# Patient Record
Sex: FEMALE | Race: WHITE | Employment: UNEMPLOYED | URBAN - METROPOLITAN AREA
[De-identification: names, ages, dates, MRNs, and addresses within clinical notes are randomized per-mention and may not be internally consistent; named-entity substitution may affect disease eponyms.]

---

## 2023-04-07 DIAGNOSIS — E11.9 TYPE 2 DIABETES MELLITUS WITHOUT COMPLICATION, UNSPECIFIED WHETHER LONG TERM INSULIN USE (MULTI): ICD-10-CM

## 2023-04-19 NOTE — TELEPHONE ENCOUNTER
Pt left a msg asking for refills of her Ozempic and Triamcinolone.  Pharm is [facility].

## 2023-05-02 ENCOUNTER — TELEPHONE (OUTPATIENT)
Dept: PRIMARY CARE | Facility: CLINIC | Age: 53
End: 2023-05-02
Payer: COMMERCIAL

## 2023-05-02 NOTE — TELEPHONE ENCOUNTER
Pt left a msg stating that she needs a new script sent for her Ozempic.  Pharm is [facility].

## 2023-05-30 LAB
ALANINE AMINOTRANSFERASE (SGPT) (U/L) IN SER/PLAS: 28 U/L (ref 7–45)
ALBUMIN (G/DL) IN SER/PLAS: 4.4 G/DL (ref 3.4–5)
ALKALINE PHOSPHATASE (U/L) IN SER/PLAS: 65 U/L (ref 33–110)
ANION GAP IN SER/PLAS: 11 MMOL/L (ref 10–20)
ASPARTATE AMINOTRANSFERASE (SGOT) (U/L) IN SER/PLAS: 26 U/L (ref 9–39)
BILIRUBIN TOTAL (MG/DL) IN SER/PLAS: 1.1 MG/DL (ref 0–1.2)
CALCIUM (MG/DL) IN SER/PLAS: 9.6 MG/DL (ref 8.6–10.3)
CARBON DIOXIDE, TOTAL (MMOL/L) IN SER/PLAS: 29 MMOL/L (ref 21–32)
CHLORIDE (MMOL/L) IN SER/PLAS: 104 MMOL/L (ref 98–107)
CHOLESTEROL (MG/DL) IN SER/PLAS: 160 MG/DL (ref 0–199)
CHOLESTEROL IN HDL (MG/DL) IN SER/PLAS: 88.4 MG/DL
CHOLESTEROL/HDL RATIO: 1.8
CREATININE (MG/DL) IN SER/PLAS: 0.77 MG/DL (ref 0.5–1.05)
ERYTHROCYTE DISTRIBUTION WIDTH (RATIO) BY AUTOMATED COUNT: 13.3 % (ref 11.5–14.5)
ERYTHROCYTE MEAN CORPUSCULAR HEMOGLOBIN CONCENTRATION (G/DL) BY AUTOMATED: 31.7 G/DL (ref 32–36)
ERYTHROCYTE MEAN CORPUSCULAR VOLUME (FL) BY AUTOMATED COUNT: 92 FL (ref 80–100)
ERYTHROCYTES (10*6/UL) IN BLOOD BY AUTOMATED COUNT: 4.3 X10E12/L (ref 4–5.2)
GFR FEMALE: >90 ML/MIN/1.73M2
GLUCOSE (MG/DL) IN SER/PLAS: 151 MG/DL (ref 74–99)
HEMATOCRIT (%) IN BLOOD BY AUTOMATED COUNT: 39.4 % (ref 36–46)
HEMOGLOBIN (G/DL) IN BLOOD: 12.5 G/DL (ref 12–16)
LDL: 56 MG/DL (ref 0–99)
LEUKOCYTES (10*3/UL) IN BLOOD BY AUTOMATED COUNT: 5 X10E9/L (ref 4.4–11.3)
PLATELETS (10*3/UL) IN BLOOD AUTOMATED COUNT: 339 X10E9/L (ref 150–450)
POTASSIUM (MMOL/L) IN SER/PLAS: 5.2 MMOL/L (ref 3.5–5.3)
PROTEIN TOTAL: 6.4 G/DL (ref 6.4–8.2)
SODIUM (MMOL/L) IN SER/PLAS: 139 MMOL/L (ref 136–145)
TRIGLYCERIDE (MG/DL) IN SER/PLAS: 79 MG/DL (ref 0–149)
UREA NITROGEN (MG/DL) IN SER/PLAS: 9 MG/DL (ref 6–23)
VLDL: 16 MG/DL (ref 0–40)

## 2023-05-31 LAB
ESTIMATED AVERAGE GLUCOSE FOR HBA1C: 140 MG/DL
HEMOGLOBIN A1C/HEMOGLOBIN TOTAL IN BLOOD: 6.5 %

## 2023-06-01 ENCOUNTER — OFFICE VISIT (OUTPATIENT)
Dept: PRIMARY CARE | Facility: CLINIC | Age: 53
End: 2023-06-01
Payer: COMMERCIAL

## 2023-06-01 VITALS
OXYGEN SATURATION: 99 % | BODY MASS INDEX: 22.42 KG/M2 | WEIGHT: 122.6 LBS | TEMPERATURE: 98 F | DIASTOLIC BLOOD PRESSURE: 60 MMHG | HEART RATE: 81 BPM | SYSTOLIC BLOOD PRESSURE: 112 MMHG

## 2023-06-01 DIAGNOSIS — E78.2 MIXED HYPERLIPIDEMIA: ICD-10-CM

## 2023-06-01 DIAGNOSIS — F41.8 DEPRESSION WITH ANXIETY: ICD-10-CM

## 2023-06-01 DIAGNOSIS — E11.9 TYPE 2 DIABETES MELLITUS WITHOUT COMPLICATION, WITHOUT LONG-TERM CURRENT USE OF INSULIN (MULTI): Primary | ICD-10-CM

## 2023-06-01 PROCEDURE — 1036F TOBACCO NON-USER: CPT | Performed by: STUDENT IN AN ORGANIZED HEALTH CARE EDUCATION/TRAINING PROGRAM

## 2023-06-01 PROCEDURE — 3044F HG A1C LEVEL LT 7.0%: CPT | Performed by: STUDENT IN AN ORGANIZED HEALTH CARE EDUCATION/TRAINING PROGRAM

## 2023-06-01 PROCEDURE — 3078F DIAST BP <80 MM HG: CPT | Performed by: STUDENT IN AN ORGANIZED HEALTH CARE EDUCATION/TRAINING PROGRAM

## 2023-06-01 PROCEDURE — 4010F ACE/ARB THERAPY RXD/TAKEN: CPT | Performed by: STUDENT IN AN ORGANIZED HEALTH CARE EDUCATION/TRAINING PROGRAM

## 2023-06-01 PROCEDURE — 99214 OFFICE O/P EST MOD 30 MIN: CPT | Performed by: STUDENT IN AN ORGANIZED HEALTH CARE EDUCATION/TRAINING PROGRAM

## 2023-06-01 PROCEDURE — 3074F SYST BP LT 130 MM HG: CPT | Performed by: STUDENT IN AN ORGANIZED HEALTH CARE EDUCATION/TRAINING PROGRAM

## 2023-06-01 RX ORDER — VENLAFAXINE HYDROCHLORIDE 75 MG/1
75 CAPSULE, EXTENDED RELEASE ORAL DAILY
Qty: 90 CAPSULE | Refills: 3 | Status: SHIPPED | OUTPATIENT
Start: 2023-06-01 | End: 2024-06-03 | Stop reason: SDUPTHER

## 2023-06-01 RX ORDER — BUPROPION HYDROCHLORIDE 300 MG/1
300 TABLET ORAL DAILY
Qty: 90 TABLET | Refills: 3 | Status: SHIPPED | OUTPATIENT
Start: 2023-06-01 | End: 2024-06-03 | Stop reason: SDUPTHER

## 2023-06-01 RX ORDER — ROSUVASTATIN CALCIUM 40 MG/1
40 TABLET, COATED ORAL DAILY
COMMUNITY
End: 2023-06-01 | Stop reason: SDUPTHER

## 2023-06-01 RX ORDER — METFORMIN HYDROCHLORIDE 1000 MG/1
1000 TABLET ORAL
Qty: 90 TABLET | Refills: 1 | Status: SHIPPED | OUTPATIENT
Start: 2023-06-01 | End: 2023-08-15 | Stop reason: SDUPTHER

## 2023-06-01 RX ORDER — VENLAFAXINE HYDROCHLORIDE 75 MG/1
75 CAPSULE, EXTENDED RELEASE ORAL DAILY
COMMUNITY
End: 2023-06-01 | Stop reason: SDUPTHER

## 2023-06-01 RX ORDER — METFORMIN HYDROCHLORIDE 1000 MG/1
1000 TABLET ORAL
COMMUNITY
Start: 2022-06-04 | End: 2023-06-01 | Stop reason: SDUPTHER

## 2023-06-01 RX ORDER — TRIAMCINOLONE ACETONIDE 1 MG/G
OINTMENT TOPICAL 2 TIMES DAILY
COMMUNITY

## 2023-06-01 RX ORDER — SEMAGLUTIDE 1.34 MG/ML
0.25 INJECTION, SOLUTION SUBCUTANEOUS
COMMUNITY
End: 2023-06-01 | Stop reason: SDUPTHER

## 2023-06-01 RX ORDER — ROSUVASTATIN CALCIUM 40 MG/1
40 TABLET, COATED ORAL DAILY
Qty: 90 TABLET | Refills: 3 | Status: SHIPPED | OUTPATIENT
Start: 2023-06-01 | End: 2024-06-03 | Stop reason: SDUPTHER

## 2023-06-01 RX ORDER — SEMAGLUTIDE 1.34 MG/ML
0.25 INJECTION, SOLUTION SUBCUTANEOUS
Qty: 1 EACH | Refills: 5 | Status: SHIPPED | OUTPATIENT
Start: 2023-06-01 | End: 2023-06-05 | Stop reason: WASHOUT

## 2023-06-01 RX ORDER — LISINOPRIL 2.5 MG/1
2.5 TABLET ORAL DAILY
Qty: 90 TABLET | Refills: 1 | Status: SHIPPED | OUTPATIENT
Start: 2023-06-01 | End: 2024-02-26

## 2023-06-01 RX ORDER — BUPROPION HYDROCHLORIDE 300 MG/1
300 TABLET ORAL DAILY
COMMUNITY
End: 2023-06-01 | Stop reason: SDUPTHER

## 2023-06-01 RX ORDER — LISINOPRIL 2.5 MG/1
2.5 TABLET ORAL DAILY
COMMUNITY
End: 2023-06-01 | Stop reason: SDUPTHER

## 2023-06-01 ASSESSMENT — ENCOUNTER SYMPTOMS
DYSURIA: 0
PALPITATIONS: 0
DIARRHEA: 0
CONSTIPATION: 0
HEADACHES: 0
FEVER: 0
FREQUENCY: 0
NAUSEA: 0
ABDOMINAL PAIN: 0
FATIGUE: 0
CHILLS: 0
NUMBNESS: 0
NERVOUS/ANXIOUS: 0
DIZZINESS: 0
COUGH: 0
WHEEZING: 0
HEMATURIA: 0
SHORTNESS OF BREATH: 0
DYSPHORIC MOOD: 0

## 2023-06-01 ASSESSMENT — PATIENT HEALTH QUESTIONNAIRE - PHQ9
5. POOR APPETITE OR OVEREATING: NOT AT ALL
6. FEELING BAD ABOUT YOURSELF - OR THAT YOU ARE A FAILURE OR HAVE LET YOURSELF OR YOUR FAMILY DOWN: NOT AT ALL
7. TROUBLE CONCENTRATING ON THINGS, SUCH AS READING THE NEWSPAPER OR WATCHING TELEVISION: NOT AT ALL
2. FEELING DOWN, DEPRESSED OR HOPELESS: NOT AT ALL
SUM OF ALL RESPONSES TO PHQ9 QUESTIONS 1 AND 2: 0
SUM OF ALL RESPONSES TO PHQ QUESTIONS 1-9: 1
1. LITTLE INTEREST OR PLEASURE IN DOING THINGS: NOT AT ALL
9. THOUGHTS THAT YOU WOULD BE BETTER OFF DEAD, OR OF HURTING YOURSELF: NOT AT ALL
1. LITTLE INTEREST OR PLEASURE IN DOING THINGS: NOT AT ALL
4. FEELING TIRED OR HAVING LITTLE ENERGY: SEVERAL DAYS
SUM OF ALL RESPONSES TO PHQ9 QUESTIONS 1 AND 2: 0
8. MOVING OR SPEAKING SO SLOWLY THAT OTHER PEOPLE COULD HAVE NOTICED. OR THE OPPOSITE, BEING SO FIGETY OR RESTLESS THAT YOU HAVE BEEN MOVING AROUND A LOT MORE THAN USUAL: NOT AT ALL
10. IF YOU CHECKED OFF ANY PROBLEMS, HOW DIFFICULT HAVE THESE PROBLEMS MADE IT FOR YOU TO DO YOUR WORK, TAKE CARE OF THINGS AT HOME, OR GET ALONG WITH OTHER PEOPLE: NOT DIFFICULT AT ALL
3. TROUBLE FALLING OR STAYING ASLEEP OR SLEEPING TOO MUCH: NOT AT ALL
2. FEELING DOWN, DEPRESSED OR HOPELESS: NOT AT ALL

## 2023-06-01 ASSESSMENT — ANXIETY QUESTIONNAIRES
IF YOU CHECKED OFF ANY PROBLEMS ON THIS QUESTIONNAIRE, HOW DIFFICULT HAVE THESE PROBLEMS MADE IT FOR YOU TO DO YOUR WORK, TAKE CARE OF THINGS AT HOME, OR GET ALONG WITH OTHER PEOPLE: NOT DIFFICULT AT ALL
5. BEING SO RESTLESS THAT IT IS HARD TO SIT STILL: NOT AT ALL
7. FEELING AFRAID AS IF SOMETHING AWFUL MIGHT HAPPEN: NOT AT ALL
GAD7 TOTAL SCORE: 2
3. WORRYING TOO MUCH ABOUT DIFFERENT THINGS: SEVERAL DAYS
1. FEELING NERVOUS, ANXIOUS, OR ON EDGE: SEVERAL DAYS
4. TROUBLE RELAXING: NOT AT ALL
6. BECOMING EASILY ANNOYED OR IRRITABLE: NOT AT ALL
2. NOT BEING ABLE TO STOP OR CONTROL WORRYING: NOT AT ALL

## 2023-06-01 NOTE — ASSESSMENT & PLAN NOTE
Stable.  We will continue Ozempic and metformin.  Ozempic makes a huge difference with her blood sugars and I feel that it is necessary that she be on this medication to control her diabetes.

## 2023-06-01 NOTE — PROGRESS NOTES
Subjective   Patient ID: Farida Astudillo is a 53 y.o. female who presents for Depression and Anxiety (Folow up).    HPI   Patient here for med refills. She is on ozempic and has been out of it for a few weeks. It has been higher since off of ozempic. On 0.25 mg of ozempic. It helps with appetite suppressant.     She has also been more fatigued.    Blood work looked good. She is doing well from anxiety and depression standpoint.     She is also had red, itchy rash on her forehead along her hairline.  No recent changes in shampoos or soaps.  Tries to avoid getting sun on her head.  She does have a history of eczema.  She has tried moisturizing    Review of Systems   Constitutional:  Negative for chills, fatigue and fever.   Respiratory:  Negative for cough, shortness of breath and wheezing.    Cardiovascular:  Negative for chest pain, palpitations and leg swelling.   Gastrointestinal:  Negative for abdominal pain, constipation, diarrhea and nausea.   Genitourinary:  Negative for dysuria, frequency, hematuria and urgency.   Neurological:  Negative for dizziness, numbness and headaches.   Psychiatric/Behavioral:  Negative for dysphoric mood. The patient is not nervous/anxious.        Objective   /60 (BP Location: Left arm, Patient Position: Sitting, BP Cuff Size: Adult)   Pulse 81   Temp 36.7 °C (98 °F) (Temporal)   Wt 55.6 kg (122 lb 9.6 oz)   SpO2 99%   BMI 22.42 kg/m²     Physical Exam  Constitutional:       Appearance: Normal appearance.   HENT:      Head: Normocephalic and atraumatic.   Eyes:      Extraocular Movements: Extraocular movements intact.      Pupils: Pupils are equal, round, and reactive to light.   Cardiovascular:      Rate and Rhythm: Normal rate and regular rhythm.      Heart sounds: Normal heart sounds. No murmur heard.  Pulmonary:      Effort: Pulmonary effort is normal.      Breath sounds: Normal breath sounds. No wheezing.   Abdominal:      General: Bowel sounds are normal.       Palpations: Abdomen is soft.      Tenderness: There is no abdominal tenderness. There is no guarding.   Musculoskeletal:         General: Normal range of motion.   Skin:     General: Skin is warm and dry.      Findings: Erythema present.          Neurological:      General: No focal deficit present.      Mental Status: She is alert and oriented to person, place, and time.   Psychiatric:         Mood and Affect: Mood normal.         Behavior: Behavior normal.         Assessment/Plan   Problem List Items Addressed This Visit       Diabetes mellitus, type 2 (CMS/HCC) - Primary     Stable.  We will continue Ozempic and metformin.  Ozempic makes a huge difference with her blood sugars and I feel that it is necessary that she be on this medication to control her diabetes.         Relevant Medications    lisinopril 2.5 mg tablet    metFORMIN (Glucophage) 1,000 mg tablet    semaglutide (Ozempic) 0.25 mg or 0.5 mg(2 mg/1.5 mL) pen injector    Depression with anxiety     Doing well with Wellbutrin and Effexor.  Medications refilled         Relevant Medications    buPROPion XL (Wellbutrin XL) 300 mg 24 hr tablet    venlafaxine XR (Effexor-XR) 75 mg 24 hr capsule    Mixed hyperlipidemia     Cholesterol looked great on blood work done few days ago.  Rosuvastatin refilled         Relevant Medications    rosuvastatin (Crestor) 40 mg tablet            Patient understands and agrees with treatment plan    Kaykay Williamson DO   06/01/23

## 2023-06-05 DIAGNOSIS — E11.9 TYPE 2 DIABETES MELLITUS WITHOUT COMPLICATION, WITHOUT LONG-TERM CURRENT USE OF INSULIN (MULTI): ICD-10-CM

## 2023-08-14 ENCOUNTER — PATIENT MESSAGE (OUTPATIENT)
Dept: PRIMARY CARE | Facility: CLINIC | Age: 53
End: 2023-08-14
Payer: COMMERCIAL

## 2023-08-14 DIAGNOSIS — E11.9 TYPE 2 DIABETES MELLITUS WITHOUT COMPLICATION, WITHOUT LONG-TERM CURRENT USE OF INSULIN (MULTI): ICD-10-CM

## 2023-08-15 RX ORDER — METFORMIN HYDROCHLORIDE 1000 MG/1
1000 TABLET ORAL
Qty: 90 TABLET | Refills: 1 | Status: SHIPPED | OUTPATIENT
Start: 2023-08-15 | End: 2023-10-17

## 2023-10-16 DIAGNOSIS — E11.9 TYPE 2 DIABETES MELLITUS WITHOUT COMPLICATION, WITHOUT LONG-TERM CURRENT USE OF INSULIN (MULTI): ICD-10-CM

## 2023-10-17 RX ORDER — METFORMIN HYDROCHLORIDE 500 MG/1
500 TABLET ORAL
Qty: 180 TABLET | Refills: 1 | OUTPATIENT
Start: 2023-10-17

## 2023-10-17 RX ORDER — METFORMIN HYDROCHLORIDE 1000 MG/1
1000 TABLET ORAL
Qty: 180 TABLET | Refills: 0 | Status: SHIPPED | OUTPATIENT
Start: 2023-10-17 | End: 2024-02-26

## 2023-12-01 ENCOUNTER — OFFICE VISIT (OUTPATIENT)
Dept: PRIMARY CARE | Facility: CLINIC | Age: 53
End: 2023-12-01
Payer: COMMERCIAL

## 2023-12-01 VITALS
SYSTOLIC BLOOD PRESSURE: 110 MMHG | BODY MASS INDEX: 21.18 KG/M2 | WEIGHT: 115.8 LBS | OXYGEN SATURATION: 97 % | TEMPERATURE: 97.7 F | HEART RATE: 73 BPM | DIASTOLIC BLOOD PRESSURE: 64 MMHG

## 2023-12-01 DIAGNOSIS — F41.8 DEPRESSION WITH ANXIETY: ICD-10-CM

## 2023-12-01 DIAGNOSIS — Z00.00 HEALTH MAINTENANCE EXAMINATION: ICD-10-CM

## 2023-12-01 DIAGNOSIS — E11.9 TYPE 2 DIABETES MELLITUS WITHOUT COMPLICATION, WITHOUT LONG-TERM CURRENT USE OF INSULIN (MULTI): Primary | ICD-10-CM

## 2023-12-01 LAB — POC HEMOGLOBIN A1C: 6 % (ref 4.2–6.5)

## 2023-12-01 PROCEDURE — 3044F HG A1C LEVEL LT 7.0%: CPT | Performed by: STUDENT IN AN ORGANIZED HEALTH CARE EDUCATION/TRAINING PROGRAM

## 2023-12-01 PROCEDURE — 83036 HEMOGLOBIN GLYCOSYLATED A1C: CPT | Performed by: STUDENT IN AN ORGANIZED HEALTH CARE EDUCATION/TRAINING PROGRAM

## 2023-12-01 PROCEDURE — 1036F TOBACCO NON-USER: CPT | Performed by: STUDENT IN AN ORGANIZED HEALTH CARE EDUCATION/TRAINING PROGRAM

## 2023-12-01 PROCEDURE — 3074F SYST BP LT 130 MM HG: CPT | Performed by: STUDENT IN AN ORGANIZED HEALTH CARE EDUCATION/TRAINING PROGRAM

## 2023-12-01 PROCEDURE — 4010F ACE/ARB THERAPY RXD/TAKEN: CPT | Performed by: STUDENT IN AN ORGANIZED HEALTH CARE EDUCATION/TRAINING PROGRAM

## 2023-12-01 PROCEDURE — 3078F DIAST BP <80 MM HG: CPT | Performed by: STUDENT IN AN ORGANIZED HEALTH CARE EDUCATION/TRAINING PROGRAM

## 2023-12-01 PROCEDURE — 99213 OFFICE O/P EST LOW 20 MIN: CPT | Performed by: STUDENT IN AN ORGANIZED HEALTH CARE EDUCATION/TRAINING PROGRAM

## 2023-12-01 ASSESSMENT — PATIENT HEALTH QUESTIONNAIRE - PHQ9
6. FEELING BAD ABOUT YOURSELF - OR THAT YOU ARE A FAILURE OR HAVE LET YOURSELF OR YOUR FAMILY DOWN: NOT AT ALL
1. LITTLE INTEREST OR PLEASURE IN DOING THINGS: NOT AT ALL
2. FEELING DOWN, DEPRESSED OR HOPELESS: NOT AT ALL
SUM OF ALL RESPONSES TO PHQ9 QUESTIONS 1 AND 2: 0
5. POOR APPETITE OR OVEREATING: SEVERAL DAYS
8. MOVING OR SPEAKING SO SLOWLY THAT OTHER PEOPLE COULD HAVE NOTICED. OR THE OPPOSITE, BEING SO FIGETY OR RESTLESS THAT YOU HAVE BEEN MOVING AROUND A LOT MORE THAN USUAL: NOT AT ALL
1. LITTLE INTEREST OR PLEASURE IN DOING THINGS: NOT AT ALL
10. IF YOU CHECKED OFF ANY PROBLEMS, HOW DIFFICULT HAVE THESE PROBLEMS MADE IT FOR YOU TO DO YOUR WORK, TAKE CARE OF THINGS AT HOME, OR GET ALONG WITH OTHER PEOPLE: SOMEWHAT DIFFICULT
7. TROUBLE CONCENTRATING ON THINGS, SUCH AS READING THE NEWSPAPER OR WATCHING TELEVISION: NOT AT ALL
2. FEELING DOWN, DEPRESSED OR HOPELESS: NOT AT ALL
3. TROUBLE FALLING OR STAYING ASLEEP OR SLEEPING TOO MUCH: SEVERAL DAYS
SUM OF ALL RESPONSES TO PHQ QUESTIONS 1-9: 3
SUM OF ALL RESPONSES TO PHQ9 QUESTIONS 1 AND 2: 0
9. THOUGHTS THAT YOU WOULD BE BETTER OFF DEAD, OR OF HURTING YOURSELF: NOT AT ALL
4. FEELING TIRED OR HAVING LITTLE ENERGY: SEVERAL DAYS

## 2023-12-01 ASSESSMENT — ENCOUNTER SYMPTOMS
ABDOMINAL PAIN: 0
CONSTIPATION: 0
PALPITATIONS: 0
DIZZINESS: 0
DIARRHEA: 0
DYSPHORIC MOOD: 0
FREQUENCY: 0
FATIGUE: 0
NUMBNESS: 0
NAUSEA: 0
DYSURIA: 0
CHILLS: 0
FEVER: 0
NERVOUS/ANXIOUS: 0
SHORTNESS OF BREATH: 0
HEADACHES: 0
WHEEZING: 0
COUGH: 0
HEMATURIA: 0

## 2023-12-01 ASSESSMENT — ANXIETY QUESTIONNAIRES
IF YOU CHECKED OFF ANY PROBLEMS ON THIS QUESTIONNAIRE, HOW DIFFICULT HAVE THESE PROBLEMS MADE IT FOR YOU TO DO YOUR WORK, TAKE CARE OF THINGS AT HOME, OR GET ALONG WITH OTHER PEOPLE: SOMEWHAT DIFFICULT
2. NOT BEING ABLE TO STOP OR CONTROL WORRYING: NOT AT ALL
3. WORRYING TOO MUCH ABOUT DIFFERENT THINGS: NOT AT ALL
4. TROUBLE RELAXING: SEVERAL DAYS
5. BEING SO RESTLESS THAT IT IS HARD TO SIT STILL: NOT AT ALL
6. BECOMING EASILY ANNOYED OR IRRITABLE: NOT AT ALL
1. FEELING NERVOUS, ANXIOUS, OR ON EDGE: NOT AT ALL
GAD7 TOTAL SCORE: 1
7. FEELING AFRAID AS IF SOMETHING AWFUL MIGHT HAPPEN: NOT AT ALL

## 2023-12-01 NOTE — PATIENT INSTRUCTIONS
Medications refilled today  Recommend trying to eat enough so that there is no further weight loss  A1c checked in office today  Will see you back in 6 months for a physical with fasting blood work prior

## 2023-12-01 NOTE — PROGRESS NOTES
Subjective   Patient ID: Farida Astudillo is a 53 y.o. female who presents for Anxiety and Depression (Follow up).    HPI   Anxiety and depression F/up  Current medication: Wellbutrin 300 mg, Effexor 75 mg  How they feel: feels good  Side effects: none  Previous medications: none  Self care: exercise    Farida Astudillo is an 53 y.o.  y.o. female who presents for follow up of diabetes. Current symptoms include: none. Patient denies increased appetite, paresthesia of the feet, and visual disturbances. Evaluation to date has included: fasting blood sugar, fasting lipid panel, hemoglobin A1C, and microalbuminuria. Home sugars: not checking . Current treatments: ozempic, metformin . Last dilated eye exam 2 years ago. She has had weight loss. Hasn't been eating much for the last week or so. She is now feeling her appetite has come back. She doesn't want to lose more weight. She was previously 10.1 on A1c but now is much better controlled.         Review of Systems   Constitutional:  Negative for chills, fatigue and fever.   Respiratory:  Negative for cough, shortness of breath and wheezing.    Cardiovascular:  Negative for chest pain, palpitations and leg swelling.   Gastrointestinal:  Negative for abdominal pain, constipation, diarrhea and nausea.   Genitourinary:  Negative for dysuria, frequency, hematuria and urgency.   Neurological:  Negative for dizziness, numbness and headaches.   Psychiatric/Behavioral:  Negative for dysphoric mood. The patient is not nervous/anxious.        Objective   /64 (BP Location: Left arm, Patient Position: Sitting, BP Cuff Size: Adult)   Pulse 73   Temp 36.5 °C (97.7 °F) (Temporal)   Wt 52.5 kg (115 lb 12.8 oz)   SpO2 97%   BMI 21.18 kg/m²     Physical Exam  Constitutional:       Appearance: Normal appearance.   HENT:      Head: Normocephalic and atraumatic.   Eyes:      Extraocular Movements: Extraocular movements intact.      Pupils: Pupils are equal, round, and reactive to  light.   Cardiovascular:      Rate and Rhythm: Normal rate and regular rhythm.      Heart sounds: Normal heart sounds. No murmur heard.  Pulmonary:      Effort: Pulmonary effort is normal.      Breath sounds: Normal breath sounds. No wheezing.   Abdominal:      General: Bowel sounds are normal.      Palpations: Abdomen is soft.      Tenderness: There is no abdominal tenderness. There is no guarding.   Musculoskeletal:         General: Normal range of motion.   Skin:     General: Skin is warm and dry.   Neurological:      General: No focal deficit present.      Mental Status: She is alert and oriented to person, place, and time.   Psychiatric:         Mood and Affect: Mood normal.         Behavior: Behavior normal.       Assessment/Plan   Problem List Items Addressed This Visit       Diabetes mellitus, type 2 (CMS/HCC) - Primary    Depression with anxiety     Other Visit Diagnoses       Health maintenance examination        Relevant Orders    Lipid Panel    Comprehensive Metabolic Panel    CBC    Hemoglobin A1C          Doing well from an anxiety and depression standpoint.  She does not need refills on her medications so we will see her back in 6 months at her physical for that  Diabetes well-controlled.  She did have weight loss so we need to be cautious about that.  She states that she has had no appetite for the last several days which is not like her.  She said she is going to make sure that her weight is not going down and that it goes back up.         Patient understands and agrees with treatment plan    Kaykay Williamson, DO   12/01/23

## 2024-02-24 DIAGNOSIS — E11.9 TYPE 2 DIABETES MELLITUS WITHOUT COMPLICATION, WITHOUT LONG-TERM CURRENT USE OF INSULIN (MULTI): ICD-10-CM

## 2024-02-26 DIAGNOSIS — E11.9 TYPE 2 DIABETES MELLITUS WITHOUT COMPLICATION, WITHOUT LONG-TERM CURRENT USE OF INSULIN (MULTI): ICD-10-CM

## 2024-02-26 RX ORDER — SEMAGLUTIDE 0.68 MG/ML
INJECTION, SOLUTION SUBCUTANEOUS
Qty: 6 ML | Refills: 2 | Status: SHIPPED | OUTPATIENT
Start: 2024-02-26 | End: 2024-02-26 | Stop reason: SDUPTHER

## 2024-02-26 RX ORDER — METFORMIN HYDROCHLORIDE 1000 MG/1
1000 TABLET ORAL
Qty: 180 TABLET | Refills: 1 | Status: SHIPPED | OUTPATIENT
Start: 2024-02-26 | End: 2024-06-03 | Stop reason: SDUPTHER

## 2024-02-26 RX ORDER — LISINOPRIL 2.5 MG/1
2.5 TABLET ORAL DAILY
Qty: 90 TABLET | Refills: 1 | Status: SHIPPED | OUTPATIENT
Start: 2024-02-26 | End: 2024-06-03 | Stop reason: SDUPTHER

## 2024-02-26 RX ORDER — SEMAGLUTIDE 0.68 MG/ML
0.5 INJECTION, SOLUTION SUBCUTANEOUS
Qty: 6 ML | Refills: 2 | Status: SHIPPED | OUTPATIENT
Start: 2024-02-26

## 2024-06-01 DIAGNOSIS — F41.8 DEPRESSION WITH ANXIETY: ICD-10-CM

## 2024-06-01 ASSESSMENT — PROMIS GLOBAL HEALTH SCALE
CARRYOUT_PHYSICAL_ACTIVITIES: MOSTLY
RATE_AVERAGE_PAIN: 0
RATE_GENERAL_HEALTH: VERY GOOD
RATE_PHYSICAL_HEALTH: VERY GOOD
RATE_QUALITY_OF_LIFE: EXCELLENT
RATE_MENTAL_HEALTH: VERY GOOD
EMOTIONAL_PROBLEMS: RARELY
RATE_AVERAGE_FATIGUE: MILD
RATE_SOCIAL_SATISFACTION: VERY GOOD
CARRYOUT_SOCIAL_ACTIVITIES: VERY GOOD

## 2024-06-03 ENCOUNTER — OFFICE VISIT (OUTPATIENT)
Dept: PRIMARY CARE | Facility: CLINIC | Age: 54
End: 2024-06-03
Payer: COMMERCIAL

## 2024-06-03 ENCOUNTER — LAB (OUTPATIENT)
Dept: LAB | Facility: LAB | Age: 54
End: 2024-06-03
Payer: COMMERCIAL

## 2024-06-03 VITALS
OXYGEN SATURATION: 98 % | DIASTOLIC BLOOD PRESSURE: 78 MMHG | BODY MASS INDEX: 20.62 KG/M2 | SYSTOLIC BLOOD PRESSURE: 112 MMHG | HEIGHT: 63 IN | TEMPERATURE: 98 F | HEART RATE: 77 BPM | WEIGHT: 116.4 LBS

## 2024-06-03 DIAGNOSIS — Z12.31 BREAST CANCER SCREENING BY MAMMOGRAM: ICD-10-CM

## 2024-06-03 DIAGNOSIS — Z00.00 HEALTH MAINTENANCE EXAMINATION: Primary | ICD-10-CM

## 2024-06-03 DIAGNOSIS — E11.9 TYPE 2 DIABETES MELLITUS WITHOUT COMPLICATION, WITHOUT LONG-TERM CURRENT USE OF INSULIN (MULTI): ICD-10-CM

## 2024-06-03 DIAGNOSIS — Z12.11 COLON CANCER SCREENING: ICD-10-CM

## 2024-06-03 DIAGNOSIS — Z00.00 HEALTH MAINTENANCE EXAMINATION: ICD-10-CM

## 2024-06-03 DIAGNOSIS — E78.2 MIXED HYPERLIPIDEMIA: ICD-10-CM

## 2024-06-03 DIAGNOSIS — F41.8 DEPRESSION WITH ANXIETY: ICD-10-CM

## 2024-06-03 LAB
ALBUMIN SERPL BCP-MCNC: 4.9 G/DL (ref 3.4–5)
ALP SERPL-CCNC: 64 U/L (ref 33–110)
ALT SERPL W P-5'-P-CCNC: 38 U/L (ref 7–45)
ANION GAP SERPL CALC-SCNC: 12 MMOL/L (ref 10–20)
AST SERPL W P-5'-P-CCNC: 32 U/L (ref 9–39)
BILIRUB SERPL-MCNC: 1.3 MG/DL (ref 0–1.2)
BUN SERPL-MCNC: 11 MG/DL (ref 6–23)
CALCIUM SERPL-MCNC: 10.2 MG/DL (ref 8.6–10.3)
CHLORIDE SERPL-SCNC: 100 MMOL/L (ref 98–107)
CHOLEST SERPL-MCNC: 162 MG/DL (ref 0–199)
CHOLESTEROL/HDL RATIO: 1.9
CO2 SERPL-SCNC: 30 MMOL/L (ref 21–32)
CREAT SERPL-MCNC: 0.81 MG/DL (ref 0.5–1.05)
EGFRCR SERPLBLD CKD-EPI 2021: 86 ML/MIN/1.73M*2
ERYTHROCYTE [DISTWIDTH] IN BLOOD BY AUTOMATED COUNT: 12.7 % (ref 11.5–14.5)
GLUCOSE SERPL-MCNC: 126 MG/DL (ref 74–99)
HCT VFR BLD AUTO: 43.5 % (ref 36–46)
HDLC SERPL-MCNC: 86.3 MG/DL
HGB BLD-MCNC: 14.4 G/DL (ref 12–16)
LDLC SERPL CALC-MCNC: 60 MG/DL
MCH RBC QN AUTO: 30.1 PG (ref 26–34)
MCHC RBC AUTO-ENTMCNC: 33.1 G/DL (ref 32–36)
MCV RBC AUTO: 91 FL (ref 80–100)
NON HDL CHOLESTEROL: 76 MG/DL (ref 0–149)
NRBC BLD-RTO: 0 /100 WBCS (ref 0–0)
PLATELET # BLD AUTO: 341 X10*3/UL (ref 150–450)
POTASSIUM SERPL-SCNC: 5 MMOL/L (ref 3.5–5.3)
PROT SERPL-MCNC: 7.3 G/DL (ref 6.4–8.2)
RBC # BLD AUTO: 4.79 X10*6/UL (ref 4–5.2)
SODIUM SERPL-SCNC: 137 MMOL/L (ref 136–145)
TRIGL SERPL-MCNC: 80 MG/DL (ref 0–149)
VLDL: 16 MG/DL (ref 0–40)
WBC # BLD AUTO: 5 X10*3/UL (ref 4.4–11.3)

## 2024-06-03 PROCEDURE — 36415 COLL VENOUS BLD VENIPUNCTURE: CPT

## 2024-06-03 PROCEDURE — 3078F DIAST BP <80 MM HG: CPT | Performed by: STUDENT IN AN ORGANIZED HEALTH CARE EDUCATION/TRAINING PROGRAM

## 2024-06-03 PROCEDURE — 80061 LIPID PANEL: CPT

## 2024-06-03 PROCEDURE — 83036 HEMOGLOBIN GLYCOSYLATED A1C: CPT

## 2024-06-03 PROCEDURE — 4010F ACE/ARB THERAPY RXD/TAKEN: CPT | Performed by: STUDENT IN AN ORGANIZED HEALTH CARE EDUCATION/TRAINING PROGRAM

## 2024-06-03 PROCEDURE — 3074F SYST BP LT 130 MM HG: CPT | Performed by: STUDENT IN AN ORGANIZED HEALTH CARE EDUCATION/TRAINING PROGRAM

## 2024-06-03 PROCEDURE — 1036F TOBACCO NON-USER: CPT | Performed by: STUDENT IN AN ORGANIZED HEALTH CARE EDUCATION/TRAINING PROGRAM

## 2024-06-03 PROCEDURE — 85027 COMPLETE CBC AUTOMATED: CPT

## 2024-06-03 PROCEDURE — G0439 PPPS, SUBSEQ VISIT: HCPCS | Performed by: STUDENT IN AN ORGANIZED HEALTH CARE EDUCATION/TRAINING PROGRAM

## 2024-06-03 PROCEDURE — 80053 COMPREHEN METABOLIC PANEL: CPT

## 2024-06-03 PROCEDURE — 99214 OFFICE O/P EST MOD 30 MIN: CPT | Performed by: STUDENT IN AN ORGANIZED HEALTH CARE EDUCATION/TRAINING PROGRAM

## 2024-06-03 RX ORDER — METFORMIN HYDROCHLORIDE 1000 MG/1
1000 TABLET ORAL
Qty: 180 TABLET | Refills: 1 | Status: SHIPPED | OUTPATIENT
Start: 2024-06-03

## 2024-06-03 RX ORDER — BUPROPION HYDROCHLORIDE 300 MG/1
300 TABLET ORAL DAILY
Qty: 90 TABLET | Refills: 3 | OUTPATIENT
Start: 2024-06-03

## 2024-06-03 RX ORDER — VENLAFAXINE HYDROCHLORIDE 75 MG/1
75 CAPSULE, EXTENDED RELEASE ORAL DAILY
Qty: 90 CAPSULE | Refills: 3 | OUTPATIENT
Start: 2024-06-03

## 2024-06-03 RX ORDER — ROSUVASTATIN CALCIUM 40 MG/1
40 TABLET, COATED ORAL DAILY
Qty: 90 TABLET | Refills: 3 | Status: SHIPPED | OUTPATIENT
Start: 2024-06-03

## 2024-06-03 RX ORDER — VENLAFAXINE HYDROCHLORIDE 75 MG/1
75 CAPSULE, EXTENDED RELEASE ORAL DAILY
Qty: 90 CAPSULE | Refills: 3 | Status: SHIPPED | OUTPATIENT
Start: 2024-06-03

## 2024-06-03 RX ORDER — LISINOPRIL 2.5 MG/1
2.5 TABLET ORAL DAILY
Qty: 90 TABLET | Refills: 1 | Status: SHIPPED | OUTPATIENT
Start: 2024-06-03

## 2024-06-03 RX ORDER — BUPROPION HYDROCHLORIDE 300 MG/1
300 TABLET ORAL DAILY
Qty: 90 TABLET | Refills: 3 | Status: SHIPPED | OUTPATIENT
Start: 2024-06-03

## 2024-06-03 ASSESSMENT — ENCOUNTER SYMPTOMS
DIZZINESS: 0
HEMATURIA: 0
COUGH: 0
WHEEZING: 0
FEVER: 0
FATIGUE: 0
DYSPHORIC MOOD: 0
CHILLS: 0
CONSTIPATION: 0
ABDOMINAL PAIN: 0
NUMBNESS: 0
PALPITATIONS: 0
NERVOUS/ANXIOUS: 0
DEPRESSION: 0
HEADACHES: 0
LOSS OF SENSATION IN FEET: 0
FREQUENCY: 0
NAUSEA: 0
SHORTNESS OF BREATH: 0
DYSURIA: 0
DIARRHEA: 0
OCCASIONAL FEELINGS OF UNSTEADINESS: 0

## 2024-06-03 ASSESSMENT — PATIENT HEALTH QUESTIONNAIRE - PHQ9
1. LITTLE INTEREST OR PLEASURE IN DOING THINGS: NOT AT ALL
2. FEELING DOWN, DEPRESSED OR HOPELESS: NOT AT ALL
SUM OF ALL RESPONSES TO PHQ9 QUESTIONS 1 AND 2: 0

## 2024-06-03 NOTE — PROGRESS NOTES
"Farida Astudillo  presents for her annual wellness exam.    HPI  Specialists seen by patient: eye doctor  -dentist  -derm    Last pap/cervical cancer screening: has been awhile, needs to see one  Last mammogram:  needs ordered  Hx of colon ca screening:  hasn't done it. Will order one   Hx of DXA: n/a  LMP/menstrual cycles: not having  Contraception: n/a  Menopause: 9 years ago  Immunizations: not up to date - declines shingrix    Diet: Follows a healthy diet  Exercise: Gets regular exercise, weights and cardio, elliptical and running   Alcohol abuse screen:   On the weekends    How many times in the past year 4 or more drinks in a day? 48 times  Lung cancer screening:   Smoking history: never a smoker  Drug use: No    Follow up of diabetes.  Current treatments: Metformin 1000 mg twice daily, Ozempic 0.5 mg weekly.  Home sugars: ran out of test strips, it was running good . Statin: Yes rosuvastatin 40 mg. ACE/ARB: Yes lisinopril 2.5    Anxiety depression well-controlled on Wellbutrin and Effexor.      Review of Systems   Constitutional:  Negative for chills, fatigue and fever.   Respiratory:  Negative for cough, shortness of breath and wheezing.    Cardiovascular:  Negative for chest pain, palpitations and leg swelling.   Gastrointestinal:  Negative for abdominal pain, constipation, diarrhea and nausea.   Genitourinary:  Negative for dysuria, frequency, hematuria and urgency.   Neurological:  Negative for dizziness, numbness and headaches.   Psychiatric/Behavioral:  Negative for dysphoric mood. The patient is not nervous/anxious.           Objective    /78 (BP Location: Left arm, Patient Position: Sitting, BP Cuff Size: Adult)   Pulse 77   Temp 36.7 °C (98 °F) (Temporal)   Ht 1.588 m (5' 2.5\")   Wt 52.8 kg (116 lb 6.4 oz)   SpO2 98%   BMI 20.95 kg/m²     Physical Exam  Constitutional:       General: She is not in acute distress.     Appearance: Normal appearance.   HENT:      Head: Normocephalic and " atraumatic.      Right Ear: Tympanic membrane and ear canal normal.      Left Ear: Tympanic membrane and ear canal normal.      Mouth/Throat:      Mouth: Mucous membranes are moist.      Pharynx: No posterior oropharyngeal erythema.   Eyes:      Extraocular Movements: Extraocular movements intact.      Pupils: Pupils are equal, round, and reactive to light.   Cardiovascular:      Rate and Rhythm: Normal rate and regular rhythm.      Heart sounds: No murmur heard.  Pulmonary:      Effort: Pulmonary effort is normal. No respiratory distress.      Breath sounds: Normal breath sounds. No wheezing.   Abdominal:      General: Bowel sounds are normal.      Palpations: Abdomen is soft.      Tenderness: There is no abdominal tenderness. There is no guarding.   Musculoskeletal:         General: Normal range of motion.      Cervical back: Neck supple.   Skin:     General: Skin is warm and dry.   Neurological:      General: No focal deficit present.      Mental Status: She is alert and oriented to person, place, and time.   Psychiatric:         Mood and Affect: Mood normal.         Behavior: Behavior normal.            Problem List Items Addressed This Visit       Diabetes mellitus, type 2 (Multi)    Relevant Medications    lisinopril 2.5 mg tablet    metFORMIN (Glucophage) 1,000 mg tablet    Other Relevant Orders    Referral to Clinical Pharmacy    Depression with anxiety    Relevant Medications    buPROPion XL (Wellbutrin XL) 300 mg 24 hr tablet    venlafaxine XR (Effexor-XR) 75 mg 24 hr capsule    Mixed hyperlipidemia    Relevant Medications    rosuvastatin (Crestor) 40 mg tablet     Other Visit Diagnoses       Health maintenance examination    -  Primary    Breast cancer screening by mammogram        Relevant Orders    BI mammo bilateral screening tomosynthesis             We will obtain fasting blood work.  Results will be communicated to the patient via MyChart or a letter.   We reviewed appropriate preventative health  screening guidelines.  We discussed regular aerobic exercise. We discussed proper nutrition and weight control.  Blood sugar significantly improved since starting Ozempic.  She is on 0.5 mg and I would like for her to continue in addition to her metformin.  I have placed a referral to clinical pharmacy to see if they can help with coverage.    Doing well in terms of her anxiety and depression.  Effexor and Wellbutrin refilled      Kaykay Williamson,   06/03/24

## 2024-06-04 DIAGNOSIS — Z12.11 COLON CANCER SCREENING: ICD-10-CM

## 2024-06-04 LAB
EST. AVERAGE GLUCOSE BLD GHB EST-MCNC: 128 MG/DL
HBA1C MFR BLD: 6.1 %

## 2024-06-04 RX ORDER — POLYETHYLENE GLYCOL 3350, SODIUM CHLORIDE, SODIUM BICARBONATE, POTASSIUM CHLORIDE 420; 11.2; 5.72; 1.48 G/4L; G/4L; G/4L; G/4L
POWDER, FOR SOLUTION ORAL
Qty: 4000 ML | Refills: 0 | Status: SHIPPED | OUTPATIENT
Start: 2024-06-04

## 2024-06-05 DIAGNOSIS — E80.6 HYPERBILIRUBINEMIA: Primary | ICD-10-CM

## 2024-06-12 ENCOUNTER — HOSPITAL ENCOUNTER (OUTPATIENT)
Dept: RADIOLOGY | Facility: CLINIC | Age: 54
Discharge: HOME | End: 2024-06-12
Payer: COMMERCIAL

## 2024-06-12 VITALS — WEIGHT: 116 LBS | HEIGHT: 63 IN | BODY MASS INDEX: 20.55 KG/M2

## 2024-06-12 DIAGNOSIS — Z12.31 BREAST CANCER SCREENING BY MAMMOGRAM: ICD-10-CM

## 2024-06-12 PROCEDURE — 77067 SCR MAMMO BI INCL CAD: CPT | Mod: LT

## 2024-06-21 ENCOUNTER — HOSPITAL ENCOUNTER (OUTPATIENT)
Dept: RADIOLOGY | Facility: EXTERNAL LOCATION | Age: 54
Discharge: HOME | End: 2024-06-21

## 2024-06-26 DIAGNOSIS — F41.8 SITUATIONAL ANXIETY: Primary | ICD-10-CM

## 2024-06-26 RX ORDER — PROPRANOLOL HYDROCHLORIDE 10 MG/1
TABLET ORAL
Qty: 10 TABLET | Refills: 0 | Status: SHIPPED | OUTPATIENT
Start: 2024-06-26

## 2024-07-02 ENCOUNTER — APPOINTMENT (OUTPATIENT)
Dept: PHARMACY | Facility: HOSPITAL | Age: 54
End: 2024-07-02
Payer: COMMERCIAL

## 2024-07-02 DIAGNOSIS — E11.9 TYPE 2 DIABETES MELLITUS WITHOUT COMPLICATION, WITHOUT LONG-TERM CURRENT USE OF INSULIN (MULTI): ICD-10-CM

## 2024-07-02 RX ORDER — DULAGLUTIDE 0.75 MG/.5ML
0.75 INJECTION, SOLUTION SUBCUTANEOUS
Qty: 2 ML | Refills: 0 | Status: SHIPPED | OUTPATIENT
Start: 2024-07-08 | End: 2024-07-03

## 2024-07-02 RX ORDER — DEXTROSE 4 G
TABLET,CHEWABLE ORAL
Qty: 1 EACH | Refills: 0 | Status: SHIPPED | OUTPATIENT
Start: 2024-07-02 | End: 2024-07-03 | Stop reason: SDUPTHER

## 2024-07-02 RX ORDER — LANCETS 33 GAUGE
EACH MISCELLANEOUS
Qty: 200 EACH | Refills: 0 | Status: SHIPPED | OUTPATIENT
Start: 2024-07-02 | End: 2024-07-03 | Stop reason: SDUPTHER

## 2024-07-02 NOTE — PROGRESS NOTES
Patient ID: Farida Astudillo is a 54 y.o. female who presents for No chief complaint on file..    Referring Provider: Kaykay Williamson DO  PCP: Kaykay Williamson DO Last visit with PCP: 6/3/24 Next visit with PCP: 24      Subjective   Treatment Adherence:   Preferred pharmacy: [facility]  Can patient afford prescribed medications: Yes, Ozempic $752/month     Diabetes  She has type 2 diabetes mellitus. Her disease course has been stable. Current diabetic treatments: Ozempic 0.25 mg weekly and metformin 1g BID. Diabetic current diet: She typically eats two meals a day and gerneraly eats healthy. Exercise: Patient reports working out 6 times a week. Home blood sugar record trend: Patient's blood dugars are well controlled. Her FBGs are typically in 120s and post-prandials are in 140s. An ACE inhibitor/angiotensin II receptor blocker is being taken.       Current diet:   Overall: Eats 1-2 meals/day and generally eats healthy, but she likes sweets (working on reducing)    Current exercise: 6x/week - typically 1.5 hr at time (wts/cardio). Patient reports she loves to work out.    Patient is using: glucometer, it is old   The patient is currently checking the blood glucose a couple times per week.    Hypoglycemia frequency: None     Blood Sugars:  Fastins   Post-prandial: 140s, occasionally goes higher     Objective     Primary/Secondary Prevention   - Statin? Yes  - ACE-I/ARB? Yes  - Aspirin? No    Pertinent PMH Review:  - PMH of Pancreatitis: No  - PMH of Retinopathy: No  - PMH of Urinary Tract Infections: No  - PMH of MTC: No      Health Maintenance:   Foot Exam: none   Eye Exam: every other year    Lipid Panel: 6/3/24 - LDL 60    There were no vitals taken for this visit.   BP Readings from Last 4 Encounters:   24 112/78   23 110/64   23 112/60   23 116/70      There were no vitals filed for this visit.     Labs  Lab Results   Component Value Date    BILITOT 1.3 (H) 2024     CALCIUM 10.2 06/03/2024    CO2 30 06/03/2024     06/03/2024    CREATININE 0.81 06/03/2024    GLUCOSE 126 (H) 06/03/2024    ALKPHOS 64 06/03/2024    K 5.0 06/03/2024    PROT 7.3 06/03/2024     06/03/2024    AST 32 06/03/2024    ALT 38 06/03/2024    BUN 11 06/03/2024    ANIONGAP 12 06/03/2024    ALBUMIN 4.9 06/03/2024    GFRF >90 05/30/2023     Lab Results   Component Value Date    TRIG 80 06/03/2024    CHOL 162 06/03/2024    LDLCALC 60 06/03/2024    HDL 86.3 06/03/2024     Lab Results   Component Value Date    HGBA1C 6.1 (H) 06/03/2024       Current Outpatient Medications   Medication Instructions    blood sugar diagnostic strip Use to test blood sugar twice daily    blood-glucose meter (OneTouch Ultra2 Meter) misc Use to test blood sugar twice daily    buPROPion XL (WELLBUTRIN XL) 300 mg, oral, Daily    lancets (OneTouch Delica Plus Lancet) 33 gauge misc Use to test blood sugar twice daily    lisinopril 2.5 mg, oral, Daily    metFORMIN (GLUCOPHAGE) 1,000 mg, oral, 2 times daily (morning and late afternoon)    Ozempic 0.5 mg, subcutaneous, Once Weekly    polyethylene glycol-electrolytes (Nulytely) 420 gram solution Drink 1/2 starting at 6 pm the night before your procedure then drink the 2nd 1/2 5 hours before procedure arrival time    propranolol (Inderal) 10 mg tablet Take 1 to 2 pills (10 to 20 mg) 30 to 60 minutes prior to anxiety provoking situation    rosuvastatin (CRESTOR) 40 mg, oral, Daily    triamcinolone (Kenalog) 0.1 % ointment Topical, 2 times daily, apply sparingly to affected area    [START ON 7/8/2024] Trulicity 0.75 mg, subcutaneous, Once Weekly    venlafaxine XR (EFFEXOR-XR) 75 mg, oral, Daily         Drug Interactions;  None at time of review    Assessment/Plan   Problem List Items Addressed This Visit       Diabetes mellitus, type 2 (Multi)     Patient's DM2 is well controlled with HbA1c 6.1% (goal less than 7%). She reports blood sugars within goal (fastings 120s, post-prandials  140s). She reports doing well on the metformin and Ozempic; however, Ozempic is almost $800/month and patient having trouble affording it. She was referred to pharmacy for other GLP-1 options. Discussed other GLP-1 options. Given she is well controlled on Ozempic 0.25 mg weekly, will switch her to Trulicity 0.75 mg weekly once she finishes her Ozempic supply as this is more affordable through her insurance. Patient agreeable; okay with filling through  mail order. Patient educated on Trulicity. Discussed her testing blood sugars, she reports that her glucometer is old and needs testing supplies. Will send new glucometer and supplies.   1. SWITCH Ozempic 0.25 mg weekly to Trulicity 0.75 mg weekly once Ozempic supply used   2. CONTINUE metformin 1g BID  3. CONTINUE to test your blood sugar up to twice daily          Relevant Medications    blood-glucose meter (OneTouch Ultra2 Meter) misc    blood sugar diagnostic strip    lancets (OneTouch Delica Plus Lancet) 33 gauge misc    dulaglutide (Trulicity) 0.75 mg/0.5 mL pen injector (Start on 7/8/2024)       Follow-up: 8/8/24 @ 3pm      Time spent with pt: Total length of time 40 (minutes) of the encounter and more than 50% was spent counseling the patient.    Gena Maradiaga, PharmD    Continue all meds under the continuation of care with the referring provider and clinical pharmacy team.

## 2024-07-03 PROCEDURE — RXMED WILLOW AMBULATORY MEDICATION CHARGE

## 2024-07-03 RX ORDER — BLOOD-GLUCOSE CONTROL, NORMAL
EACH MISCELLANEOUS
Qty: 200 EACH | Refills: 0 | Status: SHIPPED | OUTPATIENT
Start: 2024-07-03

## 2024-07-03 RX ORDER — DEXTROSE 4 G
TABLET,CHEWABLE ORAL
Qty: 1 EACH | Refills: 0 | Status: SHIPPED | OUTPATIENT
Start: 2024-07-03

## 2024-07-03 RX ORDER — DULAGLUTIDE 0.75 MG/.5ML
0.75 INJECTION, SOLUTION SUBCUTANEOUS
Qty: 2 ML | Refills: 0 | Status: SHIPPED | OUTPATIENT
Start: 2024-07-08

## 2024-07-03 NOTE — ASSESSMENT & PLAN NOTE
Patient's DM2 is well controlled with HbA1c 6.1% (goal less than 7%). She reports blood sugars within goal (fastings 120s, post-prandials 140s). She reports doing well on the metformin and Ozempic; however, Ozempic is almost $800/month and patient having trouble affording it. She was referred to pharmacy for other GLP-1 options. Discussed other GLP-1 options. Given she is well controlled on Ozempic 0.25 mg weekly, will switch her to Trulicity 0.75 mg weekly once she finishes her Ozempic supply as this is more affordable through her insurance. Patient agreeable; okay with filling through  mail order. Patient educated on Trulicity. Discussed her testing blood sugars, she reports that her glucometer is old and needs testing supplies. Will send new glucometer and supplies.   1. SWITCH Ozempic 0.25 mg weekly to Trulicity 0.75 mg weekly once Ozempic supply used   2. CONTINUE metformin 1g BID  3. CONTINUE to test your blood sugar up to twice daily

## 2024-07-05 ENCOUNTER — PHARMACY VISIT (OUTPATIENT)
Dept: PHARMACY | Facility: CLINIC | Age: 54
End: 2024-07-05
Payer: COMMERCIAL

## 2024-07-26 PROCEDURE — RXMED WILLOW AMBULATORY MEDICATION CHARGE

## 2024-07-29 ENCOUNTER — PHARMACY VISIT (OUTPATIENT)
Dept: PHARMACY | Facility: CLINIC | Age: 54
End: 2024-07-29
Payer: COMMERCIAL

## 2024-07-31 ENCOUNTER — APPOINTMENT (OUTPATIENT)
Dept: GASTROENTEROLOGY | Facility: EXTERNAL LOCATION | Age: 54
End: 2024-07-31
Payer: COMMERCIAL

## 2024-07-31 DIAGNOSIS — Z12.11 COLON CANCER SCREENING: ICD-10-CM

## 2024-07-31 DIAGNOSIS — K64.4 RESIDUAL HEMORRHOIDAL SKIN TAGS: Primary | ICD-10-CM

## 2024-07-31 PROCEDURE — 3048F LDL-C <100 MG/DL: CPT | Performed by: INTERNAL MEDICINE

## 2024-07-31 PROCEDURE — 3044F HG A1C LEVEL LT 7.0%: CPT | Performed by: INTERNAL MEDICINE

## 2024-07-31 PROCEDURE — 4010F ACE/ARB THERAPY RXD/TAKEN: CPT | Performed by: INTERNAL MEDICINE

## 2024-07-31 PROCEDURE — G0121 COLON CA SCRN NOT HI RSK IND: HCPCS | Performed by: INTERNAL MEDICINE

## 2024-08-08 ENCOUNTER — APPOINTMENT (OUTPATIENT)
Dept: PHARMACY | Facility: HOSPITAL | Age: 54
End: 2024-08-08
Payer: COMMERCIAL

## 2024-08-13 ENCOUNTER — TELEMEDICINE (OUTPATIENT)
Dept: PHARMACY | Facility: HOSPITAL | Age: 54
End: 2024-08-13
Payer: COMMERCIAL

## 2024-08-13 DIAGNOSIS — E11.9 TYPE 2 DIABETES MELLITUS WITHOUT COMPLICATION, WITHOUT LONG-TERM CURRENT USE OF INSULIN (MULTI): Primary | ICD-10-CM

## 2024-08-13 RX ORDER — DULAGLUTIDE 0.75 MG/.5ML
0.75 INJECTION, SOLUTION SUBCUTANEOUS
Qty: 2 ML | Refills: 0 | Status: SHIPPED | OUTPATIENT
Start: 2024-08-13

## 2024-08-13 NOTE — ASSESSMENT & PLAN NOTE
Patient's DM2 is well controlled with HbA1c 6.1% (goal less than 7%). She reports blood sugars within goal (fastings 120s, post-prandials 140s) previously, no recent blood sugars due to busy schedule. She reports doing well on the metformin and Ozempic in the past; however, Ozempic was almost $800/month and patient having trouble affording it. Last visit she was switched to Trulicity 0.75 mg weekly - has only taken one dose, but she states she feels no difference than Ozempic and is tolerating well. Since she just switched and does not have any recent blood sugars, will continue Trulicity 0.75 mg weekly and touch base in about a month to see how she is doing. Patient agreeable.  1. CONTINUE Trulicity 0.75 mg weekly   2. CONTINUE metformin 1g BID  3. CONTINUE to test your blood sugar daily (varying times)

## 2024-08-13 NOTE — PROGRESS NOTES
Patient ID: Farida Astudillo is a 54 y.o. female who presents for No chief complaint on file..    Referring Provider: Kaykay Williamson DO  PCP: Kaykay Williamson DO Last visit with PCP: 6/3/24 Next visit with PCP: 24      Subjective   Treatment Adherence:   Preferred pharmacy: [facility]  Can patient afford prescribed medications: Yes, Ozempic $752/month     Diabetes  She has type 2 diabetes mellitus. Her disease course has been stable. Current diabetic treatments: Trulicity 0.75 mg weekly and metformin 1g BID. Diabetic current diet: She typically eats two meals a day and gerneraly eats healthy. Exercise: Patient reports working out 6 times a week. Home blood sugar record trend: Patient's blood dugars are well controlled. Her FBGs are typically in 120s and post-prandials are in 140s; however, pateint hasn't been checking recently due to busy schedule. An ACE inhibitor/angiotensin II receptor blocker is being taken.     Patient just started the Trulicity 0.75 mg this past week as she had a colonoscopy were she was told to hold her GLP-1 for two weeks and also used up her Ozempic. She states that she has felt no different on Trulicity compared to Ozempic and is tolerating well. She has had no symptoms of low blood sugar. She has not been testing her blood sugar lately due to her busy schedule.     Current diet:   Overall: Eats 1-2 meals/day and generally eats healthy, but she likes sweets (working on reducing)    Current exercise: 6x/week - typically 1.5 hr at time (wts/cardio). Patient reports she loves to work out.    Patient is using: glucometer, it is old   The patient is currently checking the blood glucose a couple times per week.    Hypoglycemia frequency: None     Blood Sugars:  Fastins   Post-prandial: 140s, occasionally goes higher     Objective     Primary/Secondary Prevention   - Statin? Yes  - ACE-I/ARB? Yes  - Aspirin? No    Pertinent PMH Review:  - PMH of Pancreatitis: No  - PMH of  Retinopathy: No  - PMH of Urinary Tract Infections: No  - PMH of MTC: No      Health Maintenance:   Foot Exam: none   Eye Exam: every other year    Lipid Panel: 6/3/24 - LDL 60    There were no vitals taken for this visit.   BP Readings from Last 4 Encounters:   06/03/24 112/78   12/01/23 110/64   06/01/23 112/60   02/13/23 116/70      There were no vitals filed for this visit.     Labs  Lab Results   Component Value Date    BILITOT 1.3 (H) 06/03/2024    CALCIUM 10.2 06/03/2024    CO2 30 06/03/2024     06/03/2024    CREATININE 0.81 06/03/2024    GLUCOSE 126 (H) 06/03/2024    ALKPHOS 64 06/03/2024    K 5.0 06/03/2024    PROT 7.3 06/03/2024     06/03/2024    AST 32 06/03/2024    ALT 38 06/03/2024    BUN 11 06/03/2024    ANIONGAP 12 06/03/2024    ALBUMIN 4.9 06/03/2024    GFRF >90 05/30/2023     Lab Results   Component Value Date    TRIG 80 06/03/2024    CHOL 162 06/03/2024    LDLCALC 60 06/03/2024    HDL 86.3 06/03/2024     Lab Results   Component Value Date    HGBA1C 6.1 (H) 06/03/2024       Current Outpatient Medications   Medication Instructions    blood sugar diagnostic strip Use to test blood sugar twice daily    blood-glucose meter (OneTouch Ultra2 Meter) misc Use to test blood sugar twice daily    buPROPion XL (WELLBUTRIN XL) 300 mg, oral, Daily    lancets (OneTouch Delica Plus Lancet) 30 gauge misc Use to test blood sugar twice daily    lisinopril 2.5 mg, oral, Daily    metFORMIN (GLUCOPHAGE) 1,000 mg, oral, 2 times daily (morning and late afternoon)    polyethylene glycol-electrolytes (Nulytely) 420 gram solution Drink 1/2 starting at 6 pm the night before your procedure then drink the 2nd 1/2 5 hours before procedure arrival time    propranolol (Inderal) 10 mg tablet Take 1 to 2 pills (10 to 20 mg) 30 to 60 minutes prior to anxiety provoking situation    rosuvastatin (CRESTOR) 40 mg, oral, Daily    triamcinolone (Kenalog) 0.1 % ointment Topical, 2 times daily, apply sparingly to affected area     Trulicity 0.75 mg, subcutaneous, Once Weekly    venlafaxine XR (EFFEXOR-XR) 75 mg, oral, Daily         Drug Interactions;  None at time of review    Assessment/Plan   Problem List Items Addressed This Visit       Diabetes mellitus, type 2 (Multi) - Primary     Patient's DM2 is well controlled with HbA1c 6.1% (goal less than 7%). She reports blood sugars within goal (fastings 120s, post-prandials 140s) previously, no recent blood sugars due to busy schedule. She reports doing well on the metformin and Ozempic in the past; however, Ozempic was almost $800/month and patient having trouble affording it. Last visit she was switched to Trulicity 0.75 mg weekly - has only taken one dose, but she states she feels no difference than Ozempic and is tolerating well. Since she just switched and does not have any recent blood sugars, will continue Trulicity 0.75 mg weekly and touch base in about a month to see how she is doing. Patient agreeable.  1. CONTINUE Trulicity 0.75 mg weekly   2. CONTINUE metformin 1g BID  3. CONTINUE to test your blood sugar daily (varying times)         Relevant Medications    dulaglutide (Trulicity) 0.75 mg/0.5 mL pen injector         Follow-up: 9/10/24 @ 3pm      Time spent with pt: Total length of time 15 (minutes) of the encounter and more than 50% was spent counseling the patient.    Gena Maradiaga, PharmD    Continue all meds under the continuation of care with the referring provider and clinical pharmacy team.

## 2024-09-03 PROCEDURE — RXMED WILLOW AMBULATORY MEDICATION CHARGE

## 2024-09-04 ENCOUNTER — PHARMACY VISIT (OUTPATIENT)
Dept: PHARMACY | Facility: CLINIC | Age: 54
End: 2024-09-04
Payer: COMMERCIAL

## 2024-09-10 ENCOUNTER — APPOINTMENT (OUTPATIENT)
Dept: PHARMACY | Facility: HOSPITAL | Age: 54
End: 2024-09-10
Payer: COMMERCIAL

## 2024-09-10 DIAGNOSIS — E11.9 TYPE 2 DIABETES MELLITUS WITHOUT COMPLICATION, WITHOUT LONG-TERM CURRENT USE OF INSULIN (MULTI): Primary | ICD-10-CM

## 2024-09-10 RX ORDER — DULAGLUTIDE 1.5 MG/.5ML
1.5 INJECTION, SOLUTION SUBCUTANEOUS WEEKLY
Qty: 2 ML | Refills: 1 | Status: SHIPPED | OUTPATIENT
Start: 2024-09-10

## 2024-09-10 NOTE — PROGRESS NOTES
Patient ID: Farida Astudillo is a 54 y.o. female who presents for Diabetes.    Referring Provider: Kaykay Williamson DO  PCP: Kaykay Williamson DO Last visit with PCP: 6/3/24 Next visit with PCP: 24      Subjective   Treatment Adherence:   Preferred pharmacy: [facility]  Can patient afford prescribed medications: Yes, Ozempic switched to Trulicity due to Ozempic being ~$800/month (Trulicity ~$25/month)    Diabetes  She has type 2 diabetes mellitus. Her disease course has been stable. Current diabetic treatments: Trulicity 0.75 mg weekly and metformin 1g BID. Diabetic current diet: She typically eats two meals a day and generally eats healthy. Exercise: Patient reports working out 6 times a week. Home blood sugar record trend: Patient's blood sugars are well controlled, but she reports her PPGs have increased from 140s to 150-160s. An ACE inhibitor/angiotensin II receptor blocker is being taken.     Patient reports tolerating Trulicity 0.75 mg weekly well but states she has been feeling hungrier recently and craving carbs. She also states her blood sugars have increased some.    Current diet:   Overall: Eats 1-2 meals/day and generally eats healthy, but she likes sweets (working on reducing)    Current exercise: 6x/week - typically 1.5 hr at time (wts/cardio). Patient reports she loves to work out.    Patient is using: glucometer  The patient is currently checking the blood glucose a couple times per week.    Hypoglycemia frequency: None     Blood Sugars:  Fastins   Post-prandial: 150-160s    Objective     Primary/Secondary Prevention   - Statin? Yes  - ACE-I/ARB? Yes  - Aspirin? No    Pertinent PMH Review:  - PMH of Pancreatitis: No  - PMH of Retinopathy: No  - PMH of Urinary Tract Infections: No  - PMH of MTC: No      Health Maintenance:   Foot Exam: none   Eye Exam: every other year    Lipid Panel: 6/3/24 - LDL 60    There were no vitals taken for this visit.   BP Readings from Last 4 Encounters:    06/03/24 112/78   12/01/23 110/64   06/01/23 112/60   02/13/23 116/70      There were no vitals filed for this visit.     Labs  Lab Results   Component Value Date    BILITOT 1.3 (H) 06/03/2024    CALCIUM 10.2 06/03/2024    CO2 30 06/03/2024     06/03/2024    CREATININE 0.81 06/03/2024    GLUCOSE 126 (H) 06/03/2024    ALKPHOS 64 06/03/2024    K 5.0 06/03/2024    PROT 7.3 06/03/2024     06/03/2024    AST 32 06/03/2024    ALT 38 06/03/2024    BUN 11 06/03/2024    ANIONGAP 12 06/03/2024    ALBUMIN 4.9 06/03/2024    GFRF >90 05/30/2023     Lab Results   Component Value Date    TRIG 80 06/03/2024    CHOL 162 06/03/2024    LDLCALC 60 06/03/2024    HDL 86.3 06/03/2024     Lab Results   Component Value Date    HGBA1C 6.1 (H) 06/03/2024       Current Outpatient Medications   Medication Instructions    blood sugar diagnostic strip Use to test blood sugar twice daily    blood-glucose meter (OneTouch Ultra2 Meter) misc Use to test blood sugar twice daily    buPROPion XL (WELLBUTRIN XL) 300 mg, oral, Daily    lancets (OneTouch Delica Plus Lancet) 30 gauge misc Use to test blood sugar twice daily    lisinopril 2.5 mg, oral, Daily    metFORMIN (GLUCOPHAGE) 1,000 mg, oral, 2 times daily (morning and late afternoon)    polyethylene glycol-electrolytes (Nulytely) 420 gram solution Drink 1/2 starting at 6 pm the night before your procedure then drink the 2nd 1/2 5 hours before procedure arrival time    propranolol (Inderal) 10 mg tablet Take 1 to 2 pills (10 to 20 mg) 30 to 60 minutes prior to anxiety provoking situation    rosuvastatin (CRESTOR) 40 mg, oral, Daily    triamcinolone (Kenalog) 0.1 % ointment Topical, 2 times daily, apply sparingly to affected area    Trulicity 1.5 mg, subcutaneous, Weekly    venlafaxine XR (EFFEXOR-XR) 75 mg, oral, Daily         Drug Interactions;  None at time of review    Assessment/Plan   Problem List Items Addressed This Visit       Diabetes mellitus, type 2 (Multi) - Primary      Patient's DM2 is well controlled with HbA1c 6.1% (goal less than 7%). She reports blood sugars within goal although they are trending up slightly (PPGs 150-160s). She reports doing well on the metformin and Trulicity 0.75 mg weekly. She does report that since switching from Ozempic 0.5 mg weekly, she is hungrier and has been craving carbs. Given her blood sugar trend, past Ozempic dose, and increased hunger, will increase to Trulicity 1.5 mg weekly. Patient agreeable. Educated on dose increase including timing, possible side effects, and possible titration plan in the future.   1. INCREASE Trulicity to 1.5 mg weekly   2. CONTINUE metformin 1g BID  3. CONTINUE to test your blood sugar daily (varying times)         Relevant Medications    dulaglutide (Trulicity) 1.5 mg/0.5 mL pen injector injection         Follow-up: 10/8/24 @ 3pm      Time spent with pt: Total length of time 30 (minutes) of the encounter and more than 50% was spent counseling the patient.    Gena Maradiaga, PharmD    Continue all meds under the continuation of care with the referring provider and clinical pharmacy team.

## 2024-09-16 NOTE — ASSESSMENT & PLAN NOTE
Patient's DM2 is well controlled with HbA1c 6.1% (goal less than 7%). She reports blood sugars within goal although they are trending up slightly (PPGs 150-160s). She reports doing well on the metformin and Trulicity 0.75 mg weekly. She does report that since switching from Ozempic 0.5 mg weekly, she is hungrier and has been craving carbs. Given her blood sugar trend, past Ozempic dose, and increased hunger, will increase to Trulicity 1.5 mg weekly. Patient agreeable. Educated on dose increase including timing, possible side effects, and possible titration plan in the future.   1. INCREASE Trulicity to 1.5 mg weekly   2. CONTINUE metformin 1g BID  3. CONTINUE to test your blood sugar daily (varying times)

## 2024-09-26 ENCOUNTER — PHARMACY VISIT (OUTPATIENT)
Dept: PHARMACY | Facility: CLINIC | Age: 54
End: 2024-09-26
Payer: COMMERCIAL

## 2024-09-26 ENCOUNTER — TELEMEDICINE (OUTPATIENT)
Dept: PHARMACY | Facility: HOSPITAL | Age: 54
End: 2024-09-26
Payer: COMMERCIAL

## 2024-09-26 DIAGNOSIS — E11.9 TYPE 2 DIABETES MELLITUS WITHOUT COMPLICATION, WITHOUT LONG-TERM CURRENT USE OF INSULIN (MULTI): Primary | ICD-10-CM

## 2024-09-26 PROCEDURE — RXMED WILLOW AMBULATORY MEDICATION CHARGE

## 2024-09-26 NOTE — PROGRESS NOTES
Patient ID: Farida Astudillo is a 54 y.o. female who presents for Diabetes.    Referring Provider: Kaykay Williamson DO  PCP: Kaykay Williamson DO Last visit with PCP: 6/3/24 Next visit with PCP: 24      Subjective   Treatment Adherence:   Preferred pharmacy: [facility]  Can patient afford prescribed medications: Patient has high cost for all GLP-1 due to high deductible insurance plan. She is able to afford it, but it is a considerable cost.       Diabetes  She has type 2 diabetes mellitus. Her disease course has been stable. Current diabetic treatments: Trulicity 1.5 mg weekly and metformin 1g BID. Diabetic current diet: She typically eats two meals a day and generally eats healthy. Exercise: Patient reports working out 6 times a week. Home blood sugar record trend: Patient's blood sugars are well controlled. An ACE inhibitor/angiotensin II receptor blocker is being taken.     Patient reports tolerating Trulicity 1.5 mg weekly well and says she thinks her energy has improved on the higher dose. However, the cost of Trulicity has now gone from $25 to >$900 a month.     Current diet:   Overall: Eats 1-2 meals/day and generally eats healthy, but she likes sweets (working on reducing)    Current exercise: 6x/week - typically 1.5 hr at time (wts/cardio). Patient reports she loves to work out.    Patient is using: glucometer  The patient is currently checking the blood glucose a couple times per week.    Hypoglycemia frequency: None     Blood Sugars:  Fastins   Post-prandial: 150-160s    Objective     Primary/Secondary Prevention   - Statin? Yes  - ACE-I/ARB? Yes  - Aspirin? No    Pertinent PMH Review:  - PMH of Pancreatitis: No  - PMH of Retinopathy: No  - PMH of Urinary Tract Infections: No  - PMH of MTC: No      Health Maintenance:   Foot Exam: none   Eye Exam: every other year    Lipid Panel: 6/3/24 - LDL 60    There were no vitals taken for this visit.   BP Readings from Last 4 Encounters:   24  112/78   12/01/23 110/64   06/01/23 112/60   02/13/23 116/70      There were no vitals filed for this visit.     Labs  Lab Results   Component Value Date    BILITOT 1.3 (H) 06/03/2024    CALCIUM 10.2 06/03/2024    CO2 30 06/03/2024     06/03/2024    CREATININE 0.81 06/03/2024    GLUCOSE 126 (H) 06/03/2024    ALKPHOS 64 06/03/2024    K 5.0 06/03/2024    PROT 7.3 06/03/2024     06/03/2024    AST 32 06/03/2024    ALT 38 06/03/2024    BUN 11 06/03/2024    ANIONGAP 12 06/03/2024    ALBUMIN 4.9 06/03/2024    GFRF >90 05/30/2023     Lab Results   Component Value Date    TRIG 80 06/03/2024    CHOL 162 06/03/2024    LDLCALC 60 06/03/2024    HDL 86.3 06/03/2024     Lab Results   Component Value Date    HGBA1C 6.1 (H) 06/03/2024       Current Outpatient Medications   Medication Instructions    blood sugar diagnostic strip Use to test blood sugar twice daily    blood-glucose meter (OneTouch Ultra2 Meter) misc Use to test blood sugar twice daily    buPROPion XL (WELLBUTRIN XL) 300 mg, oral, Daily    lancets (OneTouch Delica Plus Lancet) 30 gauge misc Use to test blood sugar twice daily    lisinopril 2.5 mg, oral, Daily    metFORMIN (GLUCOPHAGE) 1,000 mg, oral, 2 times daily (morning and late afternoon)    polyethylene glycol-electrolytes (Nulytely) 420 gram solution Drink 1/2 starting at 6 pm the night before your procedure then drink the 2nd 1/2 5 hours before procedure arrival time    propranolol (Inderal) 10 mg tablet Take 1 to 2 pills (10 to 20 mg) 30 to 60 minutes prior to anxiety provoking situation    rosuvastatin (CRESTOR) 40 mg, oral, Daily    semaglutide 0.5 mg, subcutaneous, Every 7 days    triamcinolone (Kenalog) 0.1 % ointment Topical, 2 times daily, apply sparingly to affected area    venlafaxine XR (EFFEXOR-XR) 75 mg, oral, Daily         Drug Interactions;  None at time of review    Assessment/Plan   Problem List Items Addressed This Visit       Diabetes mellitus, type 2 (Multi) - Primary     Patient's  DM2 is well controlled with HbA1c 6.1% (goal less than 7%). She reports blood sugars within goal although they are trending up slightly (PPGs 150-160s). She reports doing well on the metformin and Trulicity 1.5 mg weekly. Patient was previously switched from Ozempic 0.5 mg weekly to Trulicity due to cost; however, Trulicity has now increased to >$900/month. Given this and that she was tolerating Ozempic well before, will switch back to Ozempic 0.5 mg weekly as it is the slightly more affordable option. Patient is agreeable.   1. SWITCH from Trulicity to Ozempic 0.5 mg weekly   2. CONTINUE metformin 1g BID  3. CONTINUE to test your blood sugar daily (varying times)         Relevant Medications    semaglutide 0.25 mg or 0.5 mg (2 mg/3 mL) pen injector       Follow-up: 10/8/24 @ 3pm      Time spent with pt: Total length of time 20 (minutes) of the encounter and more than 50% was spent counseling the patient.    Gena Maradiaga, PharmD    Continue all meds under the continuation of care with the referring provider and clinical pharmacy team.

## 2024-10-08 ENCOUNTER — APPOINTMENT (OUTPATIENT)
Dept: PHARMACY | Facility: HOSPITAL | Age: 54
End: 2024-10-08
Payer: COMMERCIAL

## 2024-10-08 DIAGNOSIS — E11.9 TYPE 2 DIABETES MELLITUS WITHOUT COMPLICATION, WITHOUT LONG-TERM CURRENT USE OF INSULIN (MULTI): Primary | ICD-10-CM

## 2024-10-08 NOTE — PROGRESS NOTES
Patient ID: Farida Astudillo is a 54 y.o. female who presents for Diabetes.    Referring Provider: Kaykay Williamson DO  PCP: Kaykay Williamson DO Last visit with PCP: 6/3/24 Next visit with PCP: 24      Subjective   Treatment Adherence:   Preferred pharmacy: [facility]  Can patient afford prescribed medications: Patient has high cost for all GLP-1 due to high deductible insurance plan. She is able to afford it, but it is a considerable cost.       Diabetes  She has type 2 diabetes mellitus. Her disease course has been stable. Current diabetic treatments: Ozempic 0.5 mg weekly and metformin 1g BID (switched back to Ozempic as Trulicity cost now more than Ozempic) Diabetic current diet: She typically eats two meals a day and generally eats healthy. Exercise: Patient reports working out 6 times a week. Home blood sugar record trend: Patient's blood sugars are well controlled. An ACE inhibitor/angiotensin II receptor blocker is being taken.     Since switching back to Ozmepic 0.5 mg weekly, patient reports her hunger has stabilized and she is doing well.    Current diet:   Overall: Eats 1-2 meals/day and generally eats healthy, but she likes sweets (working on reducing)    Current exercise: 6x/week - typically 1.5 hr at time (wts/cardio). Patient reports she loves to work out.    Patient is using: glucometer  The patient is currently checking the blood glucose a couple times per week.    Hypoglycemia frequency: None     Blood Sugars:  Fastins   Post-prandial: 150-160s    Objective     Primary/Secondary Prevention   - Statin? Yes  - ACE-I/ARB? Yes  - Aspirin? No    Pertinent PMH Review:  - PMH of Pancreatitis: No  - PMH of Retinopathy: No  - PMH of Urinary Tract Infections: No  - PMH of MTC: No      Health Maintenance:   Foot Exam: none   Eye Exam: every other year    Lipid Panel: 6/3/24 - LDL 60    There were no vitals taken for this visit.   BP Readings from Last 4 Encounters:   24 112/78   23  110/64   06/01/23 112/60   02/13/23 116/70      There were no vitals filed for this visit.     Labs  Lab Results   Component Value Date    BILITOT 1.3 (H) 06/03/2024    CALCIUM 10.2 06/03/2024    CO2 30 06/03/2024     06/03/2024    CREATININE 0.81 06/03/2024    GLUCOSE 126 (H) 06/03/2024    ALKPHOS 64 06/03/2024    K 5.0 06/03/2024    PROT 7.3 06/03/2024     06/03/2024    AST 32 06/03/2024    ALT 38 06/03/2024    BUN 11 06/03/2024    ANIONGAP 12 06/03/2024    ALBUMIN 4.9 06/03/2024    GFRF >90 05/30/2023     Lab Results   Component Value Date    TRIG 80 06/03/2024    CHOL 162 06/03/2024    LDLCALC 60 06/03/2024    HDL 86.3 06/03/2024     Lab Results   Component Value Date    HGBA1C 6.1 (H) 06/03/2024       Current Outpatient Medications   Medication Instructions    blood sugar diagnostic strip Use to test blood sugar twice daily    blood-glucose meter (OneTouch Ultra2 Meter) misc Use to test blood sugar twice daily    buPROPion XL (WELLBUTRIN XL) 300 mg, oral, Daily    lancets (OneTouch Delica Plus Lancet) 30 gauge misc Use to test blood sugar twice daily    lisinopril 2.5 mg, oral, Daily    metFORMIN (GLUCOPHAGE) 1,000 mg, oral, 2 times daily (morning and late afternoon)    Ozempic 0.5 mg, subcutaneous, Every 7 days    polyethylene glycol-electrolytes (Nulytely) 420 gram solution Drink 1/2 starting at 6 pm the night before your procedure then drink the 2nd 1/2 5 hours before procedure arrival time    propranolol (Inderal) 10 mg tablet Take 1 to 2 pills (10 to 20 mg) 30 to 60 minutes prior to anxiety provoking situation    rosuvastatin (CRESTOR) 40 mg, oral, Daily    triamcinolone (Kenalog) 0.1 % ointment Topical, 2 times daily, apply sparingly to affected area    venlafaxine XR (EFFEXOR-XR) 75 mg, oral, Daily         Drug Interactions;  None at time of review    Assessment/Plan   Problem List Items Addressed This Visit       Diabetes mellitus, type 2 (Multi) - Primary     Patient's DM2 is well  controlled with HbA1c 6.1% (goal less than 7%). At last visit Trulicity switched back to Ozempic as cost went up and Ozempic now cheapest option through her insurance. She reports doing well on metformin and Ozempic 0.5 mg weekly. Given she is controlled and doing well on this regimen, will not make changes. Will follow up in January to assess if any cheaper GLP-1 options may be available. Patient agreeable.   1. CONTINUE Ozempic 0.5 mg weekly   2. CONTINUE metformin 1g BID  3. CONTINUE to test your blood sugar daily (varying times)              Follow-up: 1/7/25 @ 3 pm      Time spent with pt: Total length of time 20 (minutes) of the encounter and more than 50% was spent counseling the patient.    Gena Maradiaga, PharmD    Continue all meds under the continuation of care with the referring provider and clinical pharmacy team.

## 2024-10-09 NOTE — ASSESSMENT & PLAN NOTE
Patient's DM2 is well controlled with HbA1c 6.1% (goal less than 7%). At last visit Trulicity switched back to Ozempic as cost went up and Ozempic now cheapest option through her insurance. She reports doing well on metformin and Ozempic 0.5 mg weekly. Given she is controlled and doing well on this regimen, will not make changes. Will follow up in January to assess if any cheaper GLP-1 options may be available. Patient agreeable.   1. CONTINUE Ozempic 0.5 mg weekly   2. CONTINUE metformin 1g BID  3. CONTINUE to test your blood sugar daily (varying times)

## 2024-10-30 PROCEDURE — RXMED WILLOW AMBULATORY MEDICATION CHARGE

## 2024-11-01 ENCOUNTER — PHARMACY VISIT (OUTPATIENT)
Dept: PHARMACY | Facility: CLINIC | Age: 54
End: 2024-11-01
Payer: COMMERCIAL

## 2024-11-30 ENCOUNTER — PHARMACY VISIT (OUTPATIENT)
Dept: PHARMACY | Facility: CLINIC | Age: 54
End: 2024-11-30
Payer: COMMERCIAL

## 2024-11-30 PROCEDURE — RXMED WILLOW AMBULATORY MEDICATION CHARGE

## 2024-12-02 ENCOUNTER — OFFICE VISIT (OUTPATIENT)
Dept: PRIMARY CARE | Facility: CLINIC | Age: 54
End: 2024-12-02
Payer: COMMERCIAL

## 2024-12-02 ENCOUNTER — APPOINTMENT (OUTPATIENT)
Dept: PRIMARY CARE | Facility: CLINIC | Age: 54
End: 2024-12-02
Payer: COMMERCIAL

## 2024-12-02 VITALS
OXYGEN SATURATION: 100 % | TEMPERATURE: 97.1 F | DIASTOLIC BLOOD PRESSURE: 70 MMHG | WEIGHT: 115.6 LBS | SYSTOLIC BLOOD PRESSURE: 110 MMHG | HEART RATE: 92 BPM | BODY MASS INDEX: 20.81 KG/M2

## 2024-12-02 DIAGNOSIS — R20.0 NUMBNESS AND TINGLING: ICD-10-CM

## 2024-12-02 DIAGNOSIS — R20.2 NUMBNESS AND TINGLING: ICD-10-CM

## 2024-12-02 DIAGNOSIS — E11.9 TYPE 2 DIABETES MELLITUS WITHOUT COMPLICATION, WITHOUT LONG-TERM CURRENT USE OF INSULIN (MULTI): Primary | ICD-10-CM

## 2024-12-02 PROCEDURE — 3044F HG A1C LEVEL LT 7.0%: CPT | Performed by: STUDENT IN AN ORGANIZED HEALTH CARE EDUCATION/TRAINING PROGRAM

## 2024-12-02 PROCEDURE — 3048F LDL-C <100 MG/DL: CPT | Performed by: STUDENT IN AN ORGANIZED HEALTH CARE EDUCATION/TRAINING PROGRAM

## 2024-12-02 PROCEDURE — 4010F ACE/ARB THERAPY RXD/TAKEN: CPT | Performed by: STUDENT IN AN ORGANIZED HEALTH CARE EDUCATION/TRAINING PROGRAM

## 2024-12-02 PROCEDURE — 99213 OFFICE O/P EST LOW 20 MIN: CPT | Performed by: STUDENT IN AN ORGANIZED HEALTH CARE EDUCATION/TRAINING PROGRAM

## 2024-12-02 PROCEDURE — 3078F DIAST BP <80 MM HG: CPT | Performed by: STUDENT IN AN ORGANIZED HEALTH CARE EDUCATION/TRAINING PROGRAM

## 2024-12-02 PROCEDURE — 1036F TOBACCO NON-USER: CPT | Performed by: STUDENT IN AN ORGANIZED HEALTH CARE EDUCATION/TRAINING PROGRAM

## 2024-12-02 PROCEDURE — 3074F SYST BP LT 130 MM HG: CPT | Performed by: STUDENT IN AN ORGANIZED HEALTH CARE EDUCATION/TRAINING PROGRAM

## 2024-12-02 RX ORDER — METFORMIN HYDROCHLORIDE 1000 MG/1
1000 TABLET ORAL
Qty: 180 TABLET | Refills: 1 | Status: SHIPPED | OUTPATIENT
Start: 2024-12-02

## 2024-12-02 ASSESSMENT — ENCOUNTER SYMPTOMS
CONSTIPATION: 0
DYSPHORIC MOOD: 0
NUMBNESS: 0
ABDOMINAL PAIN: 0
FREQUENCY: 0
NAUSEA: 0
DYSURIA: 0
COUGH: 0
DIZZINESS: 0
WHEEZING: 0
HEADACHES: 0
FEVER: 0
SHORTNESS OF BREATH: 0
FATIGUE: 0
DIARRHEA: 0
NERVOUS/ANXIOUS: 0
CHILLS: 0
PALPITATIONS: 0
HEMATURIA: 0

## 2024-12-02 NOTE — PROGRESS NOTES
Subjective   Patient ID: Farida Astudillo is a 54 y.o. female who presents for Diabetes.    HPI   Follow up of diabetes.  Current treatments: Metformin 1000 mg twice daily, Ozempic 0.5 mg weekly. She really only needs 0.25 mg. Current symptoms include: none. Patient denies paresthesia of the feet and visual disturbances. Home sugars:Hasn't been checking them as often. Last diabetic eye exam recent.  Statin: Yes  ACE/ARB: Yes     Does get some numbness at the tip of bilateral long fingers. Never both at the same time. Random, last was a few weeks ago. Lasts for up to a few hours.    Review of Systems   Constitutional:  Negative for chills, fatigue and fever.   Respiratory:  Negative for cough, shortness of breath and wheezing.    Cardiovascular:  Negative for chest pain, palpitations and leg swelling.   Gastrointestinal:  Negative for abdominal pain, constipation, diarrhea and nausea.   Genitourinary:  Negative for dysuria, frequency, hematuria and urgency.   Neurological:  Negative for dizziness, numbness and headaches.   Psychiatric/Behavioral:  Negative for dysphoric mood. The patient is not nervous/anxious.        Objective   /70 (BP Location: Left arm, Patient Position: Sitting, BP Cuff Size: Adult)   Pulse 92   Temp 36.2 °C (97.1 °F) (Skin)   Wt 52.4 kg (115 lb 9.6 oz)   SpO2 100%   BMI 20.81 kg/m²     Physical Exam  Constitutional:       Appearance: Normal appearance.   HENT:      Head: Normocephalic and atraumatic.   Eyes:      Extraocular Movements: Extraocular movements intact.      Pupils: Pupils are equal, round, and reactive to light.   Cardiovascular:      Rate and Rhythm: Normal rate and regular rhythm.      Heart sounds: Normal heart sounds. No murmur heard.  Pulmonary:      Effort: Pulmonary effort is normal.      Breath sounds: Normal breath sounds. No wheezing.   Musculoskeletal:         General: Normal range of motion.   Skin:     General: Skin is warm and dry.   Neurological:       General: No focal deficit present.      Mental Status: She is alert and oriented to person, place, and time.   Psychiatric:         Mood and Affect: Mood normal.         Behavior: Behavior normal.         Assessment/Plan   Problem List Items Addressed This Visit       Diabetes mellitus, type 2 (Multi) - Primary    Relevant Medications    semaglutide 0.25 mg or 0.5 mg (2 mg/3 mL) pen injector    metFORMIN (Glucophage) 1,000 mg tablet    Other Relevant Orders    Comprehensive Metabolic Panel    Hemoglobin A1C     Other Visit Diagnoses       Numbness and tingling        Relevant Orders    Vitamin B12          Diabetes well-controlled, will continue current medications    Unsure the cause of the numbness at the tip of her ring fingers.  Occurring infrequently.  Will check a B12 and have her keep an eye on it    Kaykay Williamson, DO  12/2/2024

## 2025-01-07 ENCOUNTER — APPOINTMENT (OUTPATIENT)
Dept: PHARMACY | Facility: HOSPITAL | Age: 55
End: 2025-01-07
Payer: COMMERCIAL

## 2025-01-07 DIAGNOSIS — E11.9 TYPE 2 DIABETES MELLITUS WITHOUT COMPLICATION, WITHOUT LONG-TERM CURRENT USE OF INSULIN (MULTI): ICD-10-CM

## 2025-01-07 RX ORDER — BLOOD-GLUCOSE CONTROL, NORMAL
EACH MISCELLANEOUS
Qty: 100 EACH | Refills: 1 | Status: SHIPPED | OUTPATIENT
Start: 2025-01-07

## 2025-01-07 NOTE — PROGRESS NOTES
Patient ID: Farida Astudillo is a 55 y.o. female who presents for Diabetes.    Referring Provider: Kaykay Williamson DO  PCP: Kaykay Williamson DO Last visit with PCP: 12/2/24 Next visit with PCP: 6/2/25      Subjective   Treatment Adherence:   Preferred pharmacy: [facility]  Can patient afford prescribed medications: Patient has high cost for all GLP-1 due to high deductible insurance plan. She is able to afford it, but it is a considerable cost.       Diabetes  She has type 2 diabetes mellitus. Her disease course has been stable. Current diabetic treatments: Ozempic 0.25 mg weekly (this is the dose she has been on since the begining not 0.5 mg) and metformin 1g BID. Diabetic current diet: She typically eats two meals a day and generally eats healthy. Exercise: Patient reports working out 6 times a week. Home blood sugar record trend: Patient's blood sugars are well controlled. An ACE inhibitor/angiotensin II receptor blocker is being taken.       Current diet:   Overall: Eats 1-2 meals/day and generally eats healthy, but she likes sweets (working on reducing)    Current exercise: 6x/week - typically 1.5 hr at time (wts/cardio). Patient reports she loves to work out.    Patient is using: glucometer  The patient is currently checking the blood glucose a couple times per week.    Hypoglycemia frequency: None       Objective     Primary/Secondary Prevention   - Statin? Yes  - ACE-I/ARB? Yes  - Aspirin? No    Pertinent PMH Review:  - PMH of Pancreatitis: No  - PMH of Retinopathy: No  - PMH of Urinary Tract Infections: No  - PMH of MTC: No      Health Maintenance:   Foot Exam: none   Eye Exam: every other year    Lipid Panel: 6/3/24 - LDL 60    There were no vitals taken for this visit.   BP Readings from Last 4 Encounters:   12/02/24 110/70   06/03/24 112/78   12/01/23 110/64   06/01/23 112/60      There were no vitals filed for this visit.     Labs  Lab Results   Component Value Date    BILITOT 1.3 (H) 06/03/2024     CALCIUM 10.2 06/03/2024    CO2 30 06/03/2024     06/03/2024    CREATININE 0.81 06/03/2024    GLUCOSE 126 (H) 06/03/2024    ALKPHOS 64 06/03/2024    K 5.0 06/03/2024    PROT 7.3 06/03/2024     06/03/2024    AST 32 06/03/2024    ALT 38 06/03/2024    BUN 11 06/03/2024    ANIONGAP 12 06/03/2024    ALBUMIN 4.9 06/03/2024    GFRF >90 05/30/2023     Lab Results   Component Value Date    TRIG 80 06/03/2024    CHOL 162 06/03/2024    LDLCALC 60 06/03/2024    HDL 86.3 06/03/2024     Lab Results   Component Value Date    HGBA1C 6.1 (H) 06/03/2024       Current Outpatient Medications   Medication Instructions    blood sugar diagnostic strip Use to test blood sugar daily    blood-glucose meter (OneTouch Ultra2 Meter) misc Use to test blood sugar twice daily    buPROPion XL (WELLBUTRIN XL) 300 mg, oral, Daily    lancets (OneTouch Delica Plus Lancet) 30 gauge misc Use to test blood sugar daily    lisinopril 2.5 mg, oral, Daily    metFORMIN (GLUCOPHAGE) 1,000 mg, oral, 2 times daily (morning and late afternoon)    polyethylene glycol-electrolytes (Nulytely) 420 gram solution Drink 1/2 starting at 6 pm the night before your procedure then drink the 2nd 1/2 5 hours before procedure arrival time    propranolol (Inderal) 10 mg tablet Take 1 to 2 pills (10 to 20 mg) 30 to 60 minutes prior to anxiety provoking situation    rosuvastatin (CRESTOR) 40 mg, oral, Daily    semaglutide 0.25 mg, subcutaneous, Every 7 days    triamcinolone (Kenalog) 0.1 % ointment 2 times daily    venlafaxine XR (EFFEXOR-XR) 75 mg, oral, Daily         Drug Interactions;  None at time of review    Assessment/Plan   Problem List Items Addressed This Visit       Diabetes mellitus, type 2 (Multi)     Patient's DM2 is well controlled with HbA1c 6.1% (goal less than 7%). She is doing well on Ozempic 0.25 mg weekly (this is dose she has been on - per patient not 0.5 mg weekly) and metformin 1 g BID. Unfortunately, Ozempic is still expensive and there aren't  options to make it more affordable. Given its expensive but she can afford it, will plan to continue current regimen.   1. CONTINUE Ozempic 0.25 mg weekly   2. CONTINUE metformin 1g BID  3. CONTINUE to test your blood sugar daily (varying times)         Relevant Medications    blood sugar diagnostic strip    lancets (OneTouch Delica Plus Lancet) 30 gauge misc    semaglutide 0.25 mg or 0.5 mg (2 mg/3 mL) pen injector    Other Relevant Orders    Referral to Clinical Pharmacy           Follow-up: 4/15/25 @ 3 pm      Time spent with pt: Total length of time 20 (minutes) of the encounter and more than 50% was spent counseling the patient.    Gena Maradiaga, PharmD    Continue all meds under the continuation of care with the referring provider and clinical pharmacy team.

## 2025-01-10 NOTE — ASSESSMENT & PLAN NOTE
Patient's DM2 is well controlled with HbA1c 6.1% (goal less than 7%). She is doing well on Ozempic 0.25 mg weekly (this is dose she has been on - per patient not 0.5 mg weekly) and metformin 1 g BID. Unfortunately, Ozempic is still expensive and there aren't options to make it more affordable. Given its expensive but she can afford it, will plan to continue current regimen.   1. CONTINUE Ozempic 0.25 mg weekly   2. CONTINUE metformin 1g BID  3. CONTINUE to test your blood sugar daily (varying times)

## 2025-02-19 DIAGNOSIS — E11.9 TYPE 2 DIABETES MELLITUS WITHOUT COMPLICATION, WITHOUT LONG-TERM CURRENT USE OF INSULIN (MULTI): ICD-10-CM

## 2025-02-19 RX ORDER — LISINOPRIL 2.5 MG/1
2.5 TABLET ORAL DAILY
Qty: 90 TABLET | Refills: 1 | Status: SHIPPED | OUTPATIENT
Start: 2025-02-19

## 2025-04-15 ENCOUNTER — APPOINTMENT (OUTPATIENT)
Dept: PHARMACY | Facility: HOSPITAL | Age: 55
End: 2025-04-15
Payer: COMMERCIAL

## 2025-06-02 ENCOUNTER — APPOINTMENT (OUTPATIENT)
Dept: PRIMARY CARE | Facility: CLINIC | Age: 55
End: 2025-06-02
Payer: COMMERCIAL

## 2025-06-02 VITALS
HEART RATE: 73 BPM | BODY MASS INDEX: 22.12 KG/M2 | OXYGEN SATURATION: 97 % | HEIGHT: 62 IN | TEMPERATURE: 97.9 F | SYSTOLIC BLOOD PRESSURE: 130 MMHG | WEIGHT: 120.2 LBS | DIASTOLIC BLOOD PRESSURE: 66 MMHG

## 2025-06-02 DIAGNOSIS — F41.8 DEPRESSION WITH ANXIETY: ICD-10-CM

## 2025-06-02 DIAGNOSIS — Z13.6 ENCOUNTER FOR SCREENING FOR CORONARY ARTERY DISEASE: ICD-10-CM

## 2025-06-02 DIAGNOSIS — Z00.00 HEALTH MAINTENANCE EXAMINATION: Primary | ICD-10-CM

## 2025-06-02 DIAGNOSIS — M25.512 CHRONIC LEFT SHOULDER PAIN: ICD-10-CM

## 2025-06-02 DIAGNOSIS — G89.29 CHRONIC LEFT SHOULDER PAIN: ICD-10-CM

## 2025-06-02 DIAGNOSIS — Z13.31 DEPRESSION SCREENING: ICD-10-CM

## 2025-06-02 DIAGNOSIS — E11.9 TYPE 2 DIABETES MELLITUS WITHOUT COMPLICATION, WITHOUT LONG-TERM CURRENT USE OF INSULIN: ICD-10-CM

## 2025-06-02 PROCEDURE — 99214 OFFICE O/P EST MOD 30 MIN: CPT | Performed by: STUDENT IN AN ORGANIZED HEALTH CARE EDUCATION/TRAINING PROGRAM

## 2025-06-02 PROCEDURE — 3008F BODY MASS INDEX DOCD: CPT | Performed by: STUDENT IN AN ORGANIZED HEALTH CARE EDUCATION/TRAINING PROGRAM

## 2025-06-02 PROCEDURE — 3075F SYST BP GE 130 - 139MM HG: CPT | Performed by: STUDENT IN AN ORGANIZED HEALTH CARE EDUCATION/TRAINING PROGRAM

## 2025-06-02 PROCEDURE — 1036F TOBACCO NON-USER: CPT | Performed by: STUDENT IN AN ORGANIZED HEALTH CARE EDUCATION/TRAINING PROGRAM

## 2025-06-02 PROCEDURE — 4010F ACE/ARB THERAPY RXD/TAKEN: CPT | Performed by: STUDENT IN AN ORGANIZED HEALTH CARE EDUCATION/TRAINING PROGRAM

## 2025-06-02 PROCEDURE — 99396 PREV VISIT EST AGE 40-64: CPT | Performed by: STUDENT IN AN ORGANIZED HEALTH CARE EDUCATION/TRAINING PROGRAM

## 2025-06-02 PROCEDURE — 3078F DIAST BP <80 MM HG: CPT | Performed by: STUDENT IN AN ORGANIZED HEALTH CARE EDUCATION/TRAINING PROGRAM

## 2025-06-02 PROCEDURE — 96127 BRIEF EMOTIONAL/BEHAV ASSMT: CPT | Performed by: STUDENT IN AN ORGANIZED HEALTH CARE EDUCATION/TRAINING PROGRAM

## 2025-06-02 RX ORDER — BUPROPION HYDROCHLORIDE 300 MG/1
300 TABLET ORAL DAILY
Qty: 90 TABLET | Refills: 3 | Status: SHIPPED | OUTPATIENT
Start: 2025-06-02

## 2025-06-02 RX ORDER — METFORMIN HYDROCHLORIDE 1000 MG/1
1000 TABLET ORAL
Qty: 180 TABLET | Refills: 1 | Status: SHIPPED | OUTPATIENT
Start: 2025-06-02

## 2025-06-02 RX ORDER — VENLAFAXINE HYDROCHLORIDE 75 MG/1
75 CAPSULE, EXTENDED RELEASE ORAL DAILY
Qty: 90 CAPSULE | Refills: 3 | Status: SHIPPED | OUTPATIENT
Start: 2025-06-02

## 2025-06-02 RX ORDER — LISINOPRIL 2.5 MG/1
2.5 TABLET ORAL DAILY
Qty: 90 TABLET | Refills: 1 | Status: SHIPPED | OUTPATIENT
Start: 2025-06-02

## 2025-06-02 ASSESSMENT — ENCOUNTER SYMPTOMS
LOSS OF SENSATION IN FEET: 0
SHORTNESS OF BREATH: 0
DIZZINESS: 0
DYSPHORIC MOOD: 0
HEMATURIA: 0
DYSURIA: 0
FATIGUE: 0
FEVER: 0
OCCASIONAL FEELINGS OF UNSTEADINESS: 0
CHILLS: 0
NUMBNESS: 0
COUGH: 0
NERVOUS/ANXIOUS: 0
CONSTIPATION: 0
FREQUENCY: 0
HEADACHES: 0
NAUSEA: 0
ABDOMINAL PAIN: 0
WHEEZING: 0
PALPITATIONS: 0
DIARRHEA: 0
DEPRESSION: 0

## 2025-06-02 ASSESSMENT — PATIENT HEALTH QUESTIONNAIRE - PHQ9
2. FEELING DOWN, DEPRESSED OR HOPELESS: NOT AT ALL
SUM OF ALL RESPONSES TO PHQ9 QUESTIONS 1 AND 2: 0
2. FEELING DOWN, DEPRESSED OR HOPELESS: NOT AT ALL
3. TROUBLE FALLING OR STAYING ASLEEP OR SLEEPING TOO MUCH: NOT AT ALL
SUM OF ALL RESPONSES TO PHQ QUESTIONS 1-9: 0
4. FEELING TIRED OR HAVING LITTLE ENERGY: NOT AT ALL
7. TROUBLE CONCENTRATING ON THINGS, SUCH AS READING THE NEWSPAPER OR WATCHING TELEVISION: NOT AT ALL
1. LITTLE INTEREST OR PLEASURE IN DOING THINGS: NOT AT ALL
8. MOVING OR SPEAKING SO SLOWLY THAT OTHER PEOPLE COULD HAVE NOTICED. OR THE OPPOSITE, BEING SO FIGETY OR RESTLESS THAT YOU HAVE BEEN MOVING AROUND A LOT MORE THAN USUAL: NOT AT ALL
SUM OF ALL RESPONSES TO PHQ9 QUESTIONS 1 AND 2: 0
1. LITTLE INTEREST OR PLEASURE IN DOING THINGS: NOT AT ALL
6. FEELING BAD ABOUT YOURSELF - OR THAT YOU ARE A FAILURE OR HAVE LET YOURSELF OR YOUR FAMILY DOWN: NOT AT ALL
9. THOUGHTS THAT YOU WOULD BE BETTER OFF DEAD, OR OF HURTING YOURSELF: NOT AT ALL
5. POOR APPETITE OR OVEREATING: NOT AT ALL

## 2025-06-02 NOTE — PROGRESS NOTES
Farida Astudillo  presents for her annual wellness exam.    HPI  Specialists seen by patient: eye doctor  -dentist    Last pap/cervical cancer screening: has been awhile, maybe 2013?  Last mammogram: 2024, declines this year  Hx of colon ca screening: Colonoscopy 2024.  Repeat in 2034  Hx of DXA: n/a  Immunizations: not up to date - declines shingrix   Diet: Follows a healthy diet  Exercise: Gets regular exercise, weights and cardio, elliptical and running   Alcohol abuse screen:   4-5   How many times in the past year 4 or more drinks in a day? 10-12 times  Lung cancer screening:   Smoking history: Quit over 10 years ago, less than 1 ppd for 20 years  Drug use: No  PHQ-9: 0  HCPOA: No    Follow up of diabetes.  Current treatments: Metformin 1000 mg twice daily, Ozempic 0.25 mg weekly.  Home sugars: Not checking . Statin: Yes rosuvastatin 40 mg. ACE/ARB: Yes lisinopril 2.5. Last A1c was 6.1 a year ago. Last urine micro: Will order    Anxiety depression well-controlled on Wellbutrin and Effexor.     Is due for fasting blood work    Also having left shoulder pain x 5 years.  Had a rotator cuff tear on her right and this feels similar.  It started when she was doing reverse flies and noticed left shoulder pain.  It is not debilitating but she does notice it especially with upright rows.  Mostly anterior.  Has not been doing any home exercises.      Review of Systems   Constitutional:  Negative for chills, fatigue and fever.   Respiratory:  Negative for cough, shortness of breath and wheezing.    Cardiovascular:  Negative for chest pain, palpitations and leg swelling.   Gastrointestinal:  Negative for abdominal pain, constipation, diarrhea and nausea.   Genitourinary:  Negative for dysuria, frequency, hematuria and urgency.   Neurological:  Negative for dizziness, numbness and headaches.   Psychiatric/Behavioral:  Negative for dysphoric mood. The patient is not nervous/anxious.           Objective    /66 (BP  "Location: Left arm, Patient Position: Sitting, BP Cuff Size: Adult)   Pulse 73   Temp 36.6 °C (97.9 °F) (Skin)   Ht 1.575 m (5' 2\")   Wt 54.5 kg (120 lb 3.2 oz)   SpO2 97%   BMI 21.98 kg/m²     Physical Exam  Constitutional:       General: She is not in acute distress.     Appearance: Normal appearance.   HENT:      Head: Normocephalic and atraumatic.      Right Ear: Tympanic membrane and ear canal normal.      Left Ear: Tympanic membrane and ear canal normal.      Mouth/Throat:      Mouth: Mucous membranes are moist.      Pharynx: No posterior oropharyngeal erythema.   Eyes:      Extraocular Movements: Extraocular movements intact.      Pupils: Pupils are equal, round, and reactive to light.   Cardiovascular:      Rate and Rhythm: Normal rate and regular rhythm.      Heart sounds: No murmur heard.  Pulmonary:      Effort: Pulmonary effort is normal. No respiratory distress.      Breath sounds: Normal breath sounds. No wheezing.   Abdominal:      General: Bowel sounds are normal.      Palpations: Abdomen is soft.      Tenderness: There is no abdominal tenderness. There is no guarding.   Musculoskeletal:         General: Normal range of motion.      Cervical back: Neck supple.   Skin:     General: Skin is warm and dry.   Neurological:      General: No focal deficit present.      Mental Status: She is alert and oriented to person, place, and time.   Psychiatric:         Mood and Affect: Mood normal.         Behavior: Behavior normal.      Right Shoulder Exam   Right shoulder exam is normal.      Left Shoulder Exam     Tenderness   The patient is experiencing tenderness in the acromioclavicular joint (Biceps tendon).    Range of Motion   The patient has normal left shoulder ROM.    Muscle Strength   Abduction: 5/5   Internal rotation: 5/5   External rotation: 5/5   Supraspinatus: 4/5   Subscapularis: 5/5   Biceps: 5/5     Tests   Wan test: negative  Cross arm: negative  Impingement: negative  Drop arm: " negative     Comments:  Positive Ransom's  Negative speeds                Problem List Items Addressed This Visit       Diabetes mellitus, type 2 (Multi)    Relevant Medications    blood sugar diagnostic    lisinopril 2.5 mg tablet    metFORMIN (Glucophage) 1,000 mg tablet    semaglutide 0.25 mg or 0.5 mg (2 mg/3 mL) pen injector    Other Relevant Orders    Hemoglobin A1C    Albumin-Creatinine Ratio, Urine Random    Depression with anxiety    Relevant Medications    buPROPion XL (Wellbutrin XL) 300 mg 24 hr tablet    venlafaxine XR (Effexor-XR) 75 mg 24 hr capsule     Other Visit Diagnoses         Health maintenance examination    -  Primary    Relevant Orders    Lipid Panel    Comprehensive Metabolic Panel    CBC      Encounter for screening for coronary artery disease        Relevant Orders    Lipid Panel      Chronic left shoulder pain          Depression screening                   We will obtain fasting blood work.  Results will be communicated to the patient via MyChart or a letter.   We reviewed appropriate preventative health screening guidelines.  We discussed regular aerobic exercise. We discussed proper nutrition and weight control.    Blood sugar significantly improved since starting Ozempic.  She is on 0.25 mg and I would like for her to continue in addition to her metformin.      Doing well in terms of her anxiety and depression.  Effexor and Wellbutrin refilled    For her shoulder, questionable rotator cuff tear versus labral tear.  She would like to hold off on any further imaging or workup at this time.  I did give her home exercises and recommended avoiding upright rows or any other exercises that seem to bother her shoulder.  Could consider x-rays and/or corticosteroid injection if needed.    5-10 minutes were spent screening for depression using PHQ-2/PHQ-9 as documented in the chart      Kaykay Williamson,   06/02/25

## 2025-08-21 LAB — VIT B12 SERPL-MCNC: 624 PG/ML (ref 200–1100)

## 2025-08-22 LAB
ALBUMIN SERPL-MCNC: 5.1 G/DL (ref 3.6–5.1)
ALBUMIN/CREAT UR: 16 MG/G CREAT
ALP SERPL-CCNC: 61 U/L (ref 37–153)
ALT SERPL-CCNC: 16 U/L (ref 6–29)
ANION GAP SERPL CALCULATED.4IONS-SCNC: 9 MMOL/L (CALC) (ref 7–17)
AST SERPL-CCNC: 19 U/L (ref 10–35)
BILIRUB SERPL-MCNC: 1.3 MG/DL (ref 0.2–1.2)
BUN SERPL-MCNC: 16 MG/DL (ref 7–25)
CALCIUM SERPL-MCNC: 10.2 MG/DL (ref 8.6–10.4)
CHLORIDE SERPL-SCNC: 101 MMOL/L (ref 98–110)
CHOLEST SERPL-MCNC: 134 MG/DL
CHOLEST/HDLC SERPL: 1.6 (CALC)
CO2 SERPL-SCNC: 28 MMOL/L (ref 20–32)
CREAT SERPL-MCNC: 0.78 MG/DL (ref 0.5–1.03)
CREAT UR-MCNC: 195 MG/DL (ref 20–275)
EGFRCR SERPLBLD CKD-EPI 2021: 90 ML/MIN/1.73M2
ERYTHROCYTE [DISTWIDTH] IN BLOOD BY AUTOMATED COUNT: 12.3 % (ref 11–15)
EST. AVERAGE GLUCOSE BLD GHB EST-MCNC: 134 MG/DL
EST. AVERAGE GLUCOSE BLD GHB EST-SCNC: 7.4 MMOL/L
GLUCOSE SERPL-MCNC: 144 MG/DL (ref 65–99)
HBA1C MFR BLD: 6.3 %
HCT VFR BLD AUTO: 43.9 % (ref 35–45)
HDLC SERPL-MCNC: 83 MG/DL
HGB BLD-MCNC: 14.3 G/DL (ref 11.7–15.5)
LDLC SERPL CALC-MCNC: 37 MG/DL (CALC)
MCH RBC QN AUTO: 29.7 PG (ref 27–33)
MCHC RBC AUTO-ENTMCNC: 32.6 G/DL (ref 32–36)
MCV RBC AUTO: 91.3 FL (ref 80–100)
MICROALBUMIN UR-MCNC: 3.2 MG/DL
NONHDLC SERPL-MCNC: 51 MG/DL (CALC)
PLATELET # BLD AUTO: 360 THOUSAND/UL (ref 140–400)
PMV BLD REES-ECKER: 9.6 FL (ref 7.5–12.5)
POTASSIUM SERPL-SCNC: 5.4 MMOL/L (ref 3.5–5.3)
PROT SERPL-MCNC: 7.5 G/DL (ref 6.1–8.1)
RBC # BLD AUTO: 4.81 MILLION/UL (ref 3.8–5.1)
SODIUM SERPL-SCNC: 138 MMOL/L (ref 135–146)
TRIGL SERPL-MCNC: 68 MG/DL
WBC # BLD AUTO: 5.1 THOUSAND/UL (ref 3.8–10.8)

## 2025-12-01 ENCOUNTER — APPOINTMENT (OUTPATIENT)
Dept: PRIMARY CARE | Facility: CLINIC | Age: 55
End: 2025-12-01
Payer: COMMERCIAL